# Patient Record
Sex: MALE | Race: WHITE | Employment: OTHER | URBAN - METROPOLITAN AREA
[De-identification: names, ages, dates, MRNs, and addresses within clinical notes are randomized per-mention and may not be internally consistent; named-entity substitution may affect disease eponyms.]

---

## 2017-01-01 ENCOUNTER — APPOINTMENT (OUTPATIENT)
Dept: INFUSION THERAPY | Age: 77
End: 2017-01-01
Payer: MEDICARE

## 2017-01-01 ENCOUNTER — HOSPITAL ENCOUNTER (OUTPATIENT)
Dept: INFUSION THERAPY | Age: 77
Discharge: HOME | End: 2017-10-20
Payer: MEDICARE

## 2017-01-01 ENCOUNTER — HOSPITAL ENCOUNTER (OUTPATIENT)
Dept: INFUSION THERAPY | Age: 77
Discharge: HOME | End: 2017-08-23
Payer: MEDICARE

## 2017-01-01 ENCOUNTER — HOSPITAL ENCOUNTER (OUTPATIENT)
Dept: LAB | Age: 77
Discharge: HOME | End: 2017-10-25
Payer: MEDICARE

## 2017-01-01 ENCOUNTER — HOSPITAL ENCOUNTER (OUTPATIENT)
Dept: LAB | Age: 77
Discharge: HOME | End: 2017-10-18
Payer: MEDICARE

## 2017-01-01 ENCOUNTER — HOSPITAL ENCOUNTER (OUTPATIENT)
Dept: INFUSION THERAPY | Age: 77
Discharge: HOME | End: 2017-08-16
Payer: MEDICARE

## 2017-01-01 ENCOUNTER — PATIENT OUTREACH (OUTPATIENT)
Dept: CASE MANAGEMENT | Age: 77
End: 2017-01-01

## 2017-01-01 ENCOUNTER — HOSPITAL ENCOUNTER (OUTPATIENT)
Dept: INFUSION THERAPY | Age: 77
Discharge: HOME | End: 2017-11-01
Payer: MEDICARE

## 2017-01-01 ENCOUNTER — HOSPITAL ENCOUNTER (OUTPATIENT)
Dept: LAB | Age: 77
Discharge: HOME | End: 2017-12-20
Payer: MEDICARE

## 2017-01-01 ENCOUNTER — HOSPITAL ENCOUNTER (OUTPATIENT)
Dept: INFUSION THERAPY | Age: 77
Discharge: HOME | End: 2017-12-06
Payer: MEDICARE

## 2017-01-01 ENCOUNTER — HOSPITAL ENCOUNTER (OUTPATIENT)
Dept: INFUSION THERAPY | Age: 77
Discharge: HOME | End: 2017-10-04
Payer: MEDICARE

## 2017-01-01 ENCOUNTER — HOSPITAL ENCOUNTER (OUTPATIENT)
Dept: LAB | Age: 77
Discharge: HOME | End: 2017-10-04
Payer: MEDICARE

## 2017-01-01 ENCOUNTER — HOSPITAL ENCOUNTER (OUTPATIENT)
Dept: PET IMAGING | Age: 77
Discharge: HOME | End: 2017-09-19
Payer: MEDICARE

## 2017-01-01 ENCOUNTER — HOSPITAL ENCOUNTER (OUTPATIENT)
Dept: INFUSION THERAPY | Age: 77
Discharge: HOME | End: 2017-10-18
Payer: MEDICARE

## 2017-01-01 ENCOUNTER — HOSPITAL ENCOUNTER (OUTPATIENT)
Dept: PET IMAGING | Age: 77
Discharge: HOME | End: 2017-12-12
Payer: MEDICARE

## 2017-01-01 ENCOUNTER — HOSPITAL ENCOUNTER (OUTPATIENT)
Dept: INFUSION THERAPY | Age: 77
Discharge: HOME | End: 2017-09-27
Payer: MEDICARE

## 2017-01-01 ENCOUNTER — HOSPITAL ENCOUNTER (OUTPATIENT)
Dept: INFUSION THERAPY | Age: 77
Discharge: HOME | End: 2017-09-13
Payer: MEDICARE

## 2017-01-01 ENCOUNTER — HOSPITAL ENCOUNTER (OUTPATIENT)
Dept: LAB | Age: 77
Discharge: HOME | End: 2017-09-27
Payer: MEDICARE

## 2017-01-01 ENCOUNTER — HOSPITAL ENCOUNTER (OUTPATIENT)
Dept: INFUSION THERAPY | Age: 77
Discharge: HOME | End: 2017-11-15
Payer: MEDICARE

## 2017-01-01 ENCOUNTER — HOSPITAL ENCOUNTER (OUTPATIENT)
Dept: INFUSION THERAPY | Age: 77
Discharge: HOME | End: 2017-10-06
Payer: MEDICARE

## 2017-01-01 ENCOUNTER — HOSPITAL ENCOUNTER (OUTPATIENT)
Dept: INFUSION THERAPY | Age: 77
End: 2017-01-01
Payer: MEDICARE

## 2017-01-01 ENCOUNTER — HOSPITAL ENCOUNTER (OUTPATIENT)
Dept: INFUSION THERAPY | Age: 77
Discharge: HOME | End: 2017-09-21
Payer: MEDICARE

## 2017-01-01 ENCOUNTER — HOSPITAL ENCOUNTER (OUTPATIENT)
Dept: INFUSION THERAPY | Age: 77
Discharge: HOME | End: 2017-09-06
Payer: MEDICARE

## 2017-01-01 ENCOUNTER — HOSPITAL ENCOUNTER (OUTPATIENT)
Dept: INFUSION THERAPY | Age: 77
Discharge: HOME | End: 2017-12-20
Payer: MEDICARE

## 2017-01-01 ENCOUNTER — HOSPITAL ENCOUNTER (OUTPATIENT)
Dept: INFUSION THERAPY | Age: 77
Discharge: HOME | End: 2017-11-06
Payer: MEDICARE

## 2017-01-01 ENCOUNTER — HOSPITAL ENCOUNTER (OUTPATIENT)
Dept: INFUSION THERAPY | Age: 77
Discharge: HOME | End: 2017-11-08
Payer: MEDICARE

## 2017-01-01 ENCOUNTER — HOSPITAL ENCOUNTER (OUTPATIENT)
Dept: INFUSION THERAPY | Age: 77
Discharge: HOME | End: 2017-11-29
Payer: MEDICARE

## 2017-01-01 ENCOUNTER — HOSPITAL ENCOUNTER (OUTPATIENT)
Dept: INFUSION THERAPY | Age: 77
Discharge: HOME | End: 2017-11-22
Payer: MEDICARE

## 2017-01-01 ENCOUNTER — HOSPITAL ENCOUNTER (OUTPATIENT)
Dept: INFUSION THERAPY | Age: 77
Discharge: HOME | End: 2017-12-12
Payer: MEDICARE

## 2017-01-01 ENCOUNTER — HOSPITAL ENCOUNTER (OUTPATIENT)
Dept: INFUSION THERAPY | Age: 77
Discharge: HOME | End: 2017-08-30
Payer: MEDICARE

## 2017-01-01 ENCOUNTER — HOSPITAL ENCOUNTER (OUTPATIENT)
Dept: INFUSION THERAPY | Age: 77
Discharge: HOME | End: 2017-10-13
Payer: MEDICARE

## 2017-01-01 ENCOUNTER — HOSPITAL ENCOUNTER (OUTPATIENT)
Dept: INFUSION THERAPY | Age: 77
Discharge: HOME | End: 2017-10-25
Payer: MEDICARE

## 2017-01-01 ENCOUNTER — HOSPITAL ENCOUNTER (OUTPATIENT)
Dept: LAB | Age: 77
Discharge: HOME | End: 2017-09-21
Payer: MEDICARE

## 2017-01-01 VITALS
WEIGHT: 170.6 LBS | TEMPERATURE: 97.5 F | SYSTOLIC BLOOD PRESSURE: 101 MMHG | RESPIRATION RATE: 16 BRPM | OXYGEN SATURATION: 94 % | HEART RATE: 84 BPM | DIASTOLIC BLOOD PRESSURE: 52 MMHG | BODY MASS INDEX: 24.48 KG/M2

## 2017-01-01 VITALS
HEART RATE: 84 BPM | BODY MASS INDEX: 24.54 KG/M2 | WEIGHT: 171 LBS | SYSTOLIC BLOOD PRESSURE: 99 MMHG | OXYGEN SATURATION: 92 % | TEMPERATURE: 97.6 F | RESPIRATION RATE: 18 BRPM | DIASTOLIC BLOOD PRESSURE: 54 MMHG

## 2017-01-01 VITALS
RESPIRATION RATE: 18 BRPM | TEMPERATURE: 97.9 F | SYSTOLIC BLOOD PRESSURE: 110 MMHG | WEIGHT: 178.8 LBS | BODY MASS INDEX: 25.66 KG/M2 | HEART RATE: 80 BPM | DIASTOLIC BLOOD PRESSURE: 66 MMHG | OXYGEN SATURATION: 93 %

## 2017-01-01 VITALS
RESPIRATION RATE: 16 BRPM | SYSTOLIC BLOOD PRESSURE: 100 MMHG | OXYGEN SATURATION: 95 % | HEART RATE: 83 BPM | TEMPERATURE: 98 F | DIASTOLIC BLOOD PRESSURE: 57 MMHG | WEIGHT: 188.8 LBS | BODY MASS INDEX: 27.09 KG/M2

## 2017-01-01 VITALS
SYSTOLIC BLOOD PRESSURE: 106 MMHG | TEMPERATURE: 98 F | OXYGEN SATURATION: 92 % | RESPIRATION RATE: 18 BRPM | WEIGHT: 176.5 LBS | HEART RATE: 69 BPM | BODY MASS INDEX: 25.33 KG/M2 | DIASTOLIC BLOOD PRESSURE: 65 MMHG

## 2017-01-01 VITALS
DIASTOLIC BLOOD PRESSURE: 50 MMHG | OXYGEN SATURATION: 94 % | TEMPERATURE: 97.7 F | HEART RATE: 86 BPM | SYSTOLIC BLOOD PRESSURE: 115 MMHG | RESPIRATION RATE: 18 BRPM | BODY MASS INDEX: 24.82 KG/M2 | WEIGHT: 173 LBS

## 2017-01-01 VITALS
OXYGEN SATURATION: 96 % | RESPIRATION RATE: 18 BRPM | DIASTOLIC BLOOD PRESSURE: 58 MMHG | TEMPERATURE: 97.8 F | SYSTOLIC BLOOD PRESSURE: 101 MMHG | HEART RATE: 90 BPM | BODY MASS INDEX: 25.42 KG/M2 | WEIGHT: 187.4 LBS

## 2017-01-01 VITALS
TEMPERATURE: 98.3 F | OXYGEN SATURATION: 95 % | SYSTOLIC BLOOD PRESSURE: 105 MMHG | BODY MASS INDEX: 26.29 KG/M2 | WEIGHT: 183.2 LBS | DIASTOLIC BLOOD PRESSURE: 61 MMHG | HEART RATE: 86 BPM | RESPIRATION RATE: 18 BRPM

## 2017-01-01 VITALS
WEIGHT: 177.6 LBS | SYSTOLIC BLOOD PRESSURE: 107 MMHG | BODY MASS INDEX: 25.48 KG/M2 | RESPIRATION RATE: 16 BRPM | OXYGEN SATURATION: 94 % | DIASTOLIC BLOOD PRESSURE: 51 MMHG | HEART RATE: 81 BPM | TEMPERATURE: 99.7 F

## 2017-01-01 VITALS
SYSTOLIC BLOOD PRESSURE: 110 MMHG | RESPIRATION RATE: 16 BRPM | DIASTOLIC BLOOD PRESSURE: 48 MMHG | WEIGHT: 177.4 LBS | OXYGEN SATURATION: 97 % | HEART RATE: 74 BPM | TEMPERATURE: 97.8 F | BODY MASS INDEX: 25.45 KG/M2

## 2017-01-01 VITALS
BODY MASS INDEX: 24.28 KG/M2 | RESPIRATION RATE: 18 BRPM | SYSTOLIC BLOOD PRESSURE: 117 MMHG | OXYGEN SATURATION: 96 % | TEMPERATURE: 97.7 F | WEIGHT: 169.2 LBS | DIASTOLIC BLOOD PRESSURE: 58 MMHG | HEART RATE: 79 BPM

## 2017-01-01 VITALS
BODY MASS INDEX: 24.48 KG/M2 | TEMPERATURE: 98.2 F | OXYGEN SATURATION: 96 % | DIASTOLIC BLOOD PRESSURE: 59 MMHG | WEIGHT: 170.6 LBS | RESPIRATION RATE: 18 BRPM | SYSTOLIC BLOOD PRESSURE: 125 MMHG | HEART RATE: 94 BPM

## 2017-01-01 VITALS
WEIGHT: 169.6 LBS | TEMPERATURE: 97.3 F | HEART RATE: 79 BPM | BODY MASS INDEX: 24.34 KG/M2 | SYSTOLIC BLOOD PRESSURE: 103 MMHG | OXYGEN SATURATION: 95 % | DIASTOLIC BLOOD PRESSURE: 51 MMHG | RESPIRATION RATE: 18 BRPM

## 2017-01-01 VITALS
SYSTOLIC BLOOD PRESSURE: 90 MMHG | HEART RATE: 83 BPM | BODY MASS INDEX: 24.39 KG/M2 | DIASTOLIC BLOOD PRESSURE: 53 MMHG | OXYGEN SATURATION: 94 % | RESPIRATION RATE: 18 BRPM | TEMPERATURE: 98.1 F | WEIGHT: 170 LBS

## 2017-01-01 VITALS
TEMPERATURE: 97.4 F | HEART RATE: 77 BPM | SYSTOLIC BLOOD PRESSURE: 120 MMHG | OXYGEN SATURATION: 97 % | DIASTOLIC BLOOD PRESSURE: 45 MMHG | RESPIRATION RATE: 18 BRPM

## 2017-01-01 VITALS
RESPIRATION RATE: 18 BRPM | TEMPERATURE: 97.6 F | OXYGEN SATURATION: 94 % | HEART RATE: 77 BPM | SYSTOLIC BLOOD PRESSURE: 107 MMHG | WEIGHT: 171.2 LBS | BODY MASS INDEX: 24.56 KG/M2 | DIASTOLIC BLOOD PRESSURE: 65 MMHG

## 2017-01-01 VITALS
HEART RATE: 85 BPM | TEMPERATURE: 98.4 F | SYSTOLIC BLOOD PRESSURE: 106 MMHG | OXYGEN SATURATION: 96 % | RESPIRATION RATE: 18 BRPM | BODY MASS INDEX: 25.04 KG/M2 | DIASTOLIC BLOOD PRESSURE: 57 MMHG | WEIGHT: 184.6 LBS

## 2017-01-01 VITALS
TEMPERATURE: 98.4 F | DIASTOLIC BLOOD PRESSURE: 57 MMHG | OXYGEN SATURATION: 95 % | RESPIRATION RATE: 18 BRPM | HEART RATE: 84 BPM | SYSTOLIC BLOOD PRESSURE: 116 MMHG

## 2017-01-01 DIAGNOSIS — C34.10 MALIGNANT NEOPLASM OF UPPER LOBE OF LUNG, UNSPECIFIED LATERALITY (HCC): ICD-10-CM

## 2017-01-01 DIAGNOSIS — C34.12 MALIGNANT NEOPLASM OF UPPER LOBE OF LEFT LUNG (HCC): ICD-10-CM

## 2017-01-01 LAB
ALBUMIN SERPL-MCNC: 2.9 G/DL (ref 3.2–4.6)
ALBUMIN SERPL-MCNC: 3 G/DL (ref 3.2–4.6)
ALBUMIN SERPL-MCNC: 3.2 G/DL (ref 3.2–4.6)
ALBUMIN SERPL-MCNC: 3.2 G/DL (ref 3.2–4.6)
ALBUMIN SERPL-MCNC: 3.4 G/DL (ref 3.2–4.6)
ALBUMIN SERPL-MCNC: 3.4 G/DL (ref 3.2–4.6)
ALBUMIN SERPL-MCNC: 3.5 G/DL (ref 3.2–4.6)
ALBUMIN/GLOB SERPL: 0.8 {RATIO} (ref 1.2–3.5)
ALBUMIN/GLOB SERPL: 0.9 {RATIO}
ALBUMIN/GLOB SERPL: 0.9 {RATIO} (ref 1.2–3.5)
ALBUMIN/GLOB SERPL: 0.9 {RATIO} (ref 1.2–3.5)
ALBUMIN/GLOB SERPL: 1 {RATIO} (ref 1.2–3.5)
ALP SERPL-CCNC: 112 U/L (ref 50–136)
ALP SERPL-CCNC: 119 U/L (ref 50–136)
ALP SERPL-CCNC: 82 U/L (ref 50–136)
ALP SERPL-CCNC: 85 U/L (ref 50–136)
ALP SERPL-CCNC: 94 U/L (ref 50–136)
ALP SERPL-CCNC: 95 U/L (ref 50–136)
ALP SERPL-CCNC: 96 U/L (ref 50–136)
ALT SERPL-CCNC: 17 U/L (ref 12–65)
ALT SERPL-CCNC: 18 U/L (ref 12–65)
ALT SERPL-CCNC: 21 U/L (ref 12–65)
ALT SERPL-CCNC: 22 U/L (ref 12–65)
ALT SERPL-CCNC: 23 U/L (ref 12–65)
ALT SERPL-CCNC: 32 U/L (ref 12–65)
ALT SERPL-CCNC: 36 U/L (ref 12–65)
ANION GAP SERPL CALC-SCNC: 10 MMOL/L
ANION GAP SERPL CALC-SCNC: 10 MMOL/L (ref 7–16)
ANION GAP SERPL CALC-SCNC: 12 MMOL/L (ref 7–16)
ANION GAP SERPL CALC-SCNC: 6 MMOL/L (ref 7–16)
ANION GAP SERPL CALC-SCNC: 7 MMOL/L (ref 7–16)
ANION GAP SERPL CALC-SCNC: 7 MMOL/L (ref 7–16)
ANION GAP SERPL CALC-SCNC: 8 MMOL/L (ref 7–16)
AST SERPL-CCNC: 13 U/L (ref 15–37)
AST SERPL-CCNC: 14 U/L (ref 15–37)
AST SERPL-CCNC: 15 U/L (ref 15–37)
AST SERPL-CCNC: 15 U/L (ref 15–37)
AST SERPL-CCNC: 18 U/L (ref 15–37)
AST SERPL-CCNC: 19 U/L (ref 15–37)
AST SERPL-CCNC: 25 U/L (ref 15–37)
BASOPHILS # BLD: 0 K/UL (ref 0–0.2)
BASOPHILS NFR BLD: 0 % (ref 0–2)
BASOPHILS NFR BLD: 0 % (ref 0–2)
BASOPHILS NFR BLD: 1 % (ref 0–2)
BILIRUB SERPL-MCNC: 0.5 MG/DL (ref 0.2–1.1)
BILIRUB SERPL-MCNC: 0.5 MG/DL (ref 0.2–1.1)
BILIRUB SERPL-MCNC: 0.6 MG/DL (ref 0.2–1.1)
BILIRUB SERPL-MCNC: 0.7 MG/DL (ref 0.2–1.1)
BILIRUB SERPL-MCNC: 0.8 MG/DL (ref 0.2–1.1)
BILIRUB SERPL-MCNC: 0.9 MG/DL (ref 0.2–1.1)
BILIRUB SERPL-MCNC: 1 MG/DL (ref 0.2–1.1)
BUN SERPL-MCNC: 11 MG/DL (ref 8–23)
BUN SERPL-MCNC: 12 MG/DL (ref 8–23)
BUN SERPL-MCNC: 13 MG/DL (ref 8–23)
BUN SERPL-MCNC: 20 MG/DL (ref 8–23)
BUN SERPL-MCNC: 20 MG/DL (ref 8–23)
BUN SERPL-MCNC: 21 MG/DL (ref 8–23)
BUN SERPL-MCNC: 23 MG/DL (ref 8–23)
CALCIUM SERPL-MCNC: 8.7 MG/DL (ref 8.3–10.4)
CALCIUM SERPL-MCNC: 9 MG/DL (ref 8.3–10.4)
CALCIUM SERPL-MCNC: 9.1 MG/DL (ref 8.3–10.4)
CALCIUM SERPL-MCNC: 9.2 MG/DL (ref 8.3–10.4)
CALCIUM SERPL-MCNC: 9.6 MG/DL (ref 8.3–10.4)
CHLORIDE SERPL-SCNC: 101 MMOL/L (ref 98–107)
CHLORIDE SERPL-SCNC: 104 MMOL/L (ref 98–107)
CHLORIDE SERPL-SCNC: 106 MMOL/L (ref 98–107)
CHLORIDE SERPL-SCNC: 107 MMOL/L (ref 98–107)
CHLORIDE SERPL-SCNC: 107 MMOL/L (ref 98–107)
CHLORIDE SERPL-SCNC: 108 MMOL/L (ref 98–107)
CHLORIDE SERPL-SCNC: 109 MMOL/L (ref 98–107)
CO2 SERPL-SCNC: 22 MMOL/L (ref 21–32)
CO2 SERPL-SCNC: 24 MMOL/L (ref 21–32)
CO2 SERPL-SCNC: 24 MMOL/L (ref 21–32)
CO2 SERPL-SCNC: 25 MMOL/L (ref 21–32)
CO2 SERPL-SCNC: 25 MMOL/L (ref 21–32)
CO2 SERPL-SCNC: 27 MMOL/L (ref 21–32)
CO2 SERPL-SCNC: 27 MMOL/L (ref 21–32)
CREAT SERPL-MCNC: 1.24 MG/DL (ref 0.8–1.5)
CREAT SERPL-MCNC: 1.29 MG/DL (ref 0.8–1.5)
CREAT SERPL-MCNC: 1.39 MG/DL (ref 0.8–1.5)
CREAT SERPL-MCNC: 1.39 MG/DL (ref 0.8–1.5)
CREAT SERPL-MCNC: 1.5 MG/DL (ref 0.8–1.5)
CREAT SERPL-MCNC: 1.55 MG/DL (ref 0.8–1.5)
CREAT SERPL-MCNC: 1.6 MG/DL (ref 0.8–1.5)
DIFFERENTIAL METHOD BLD: ABNORMAL
EOSINOPHIL # BLD: 0 K/UL (ref 0–0.8)
EOSINOPHIL # BLD: 0.1 K/UL (ref 0–0.8)
EOSINOPHIL # BLD: 0.1 K/UL (ref 0–0.8)
EOSINOPHIL NFR BLD: 0 % (ref 0.5–7.8)
EOSINOPHIL NFR BLD: 1 % (ref 0.5–7.8)
EOSINOPHIL NFR BLD: 1 % (ref 0.5–7.8)
EOSINOPHIL NFR BLD: 2 % (ref 0.5–7.8)
EOSINOPHIL NFR BLD: 5 % (ref 0.5–7.8)
ERYTHROCYTE [DISTWIDTH] IN BLOOD BY AUTOMATED COUNT: 12.7 % (ref 11.9–14.6)
ERYTHROCYTE [DISTWIDTH] IN BLOOD BY AUTOMATED COUNT: 12.7 % (ref 11.9–14.6)
ERYTHROCYTE [DISTWIDTH] IN BLOOD BY AUTOMATED COUNT: 13.1 % (ref 11.9–14.6)
ERYTHROCYTE [DISTWIDTH] IN BLOOD BY AUTOMATED COUNT: 14.1 % (ref 11.9–14.6)
ERYTHROCYTE [DISTWIDTH] IN BLOOD BY AUTOMATED COUNT: 14.2 % (ref 11.9–14.6)
ERYTHROCYTE [DISTWIDTH] IN BLOOD BY AUTOMATED COUNT: 14.3 % (ref 11.9–14.6)
ERYTHROCYTE [DISTWIDTH] IN BLOOD BY AUTOMATED COUNT: 17.2 % (ref 11.9–14.6)
GLOBULIN SER CALC-MCNC: 3.4 G/DL (ref 2.3–3.5)
GLOBULIN SER CALC-MCNC: 3.5 G/DL (ref 2.3–3.5)
GLOBULIN SER CALC-MCNC: 3.5 G/DL (ref 2.3–3.5)
GLOBULIN SER CALC-MCNC: 3.7 G/DL (ref 2.3–3.5)
GLOBULIN SER CALC-MCNC: 3.8 G/DL
GLOBULIN SER CALC-MCNC: 3.8 G/DL (ref 2.3–3.5)
GLOBULIN SER CALC-MCNC: 3.9 G/DL (ref 2.3–3.5)
GLUCOSE SERPL-MCNC: 116 MG/DL (ref 65–100)
GLUCOSE SERPL-MCNC: 116 MG/DL (ref 65–100)
GLUCOSE SERPL-MCNC: 117 MG/DL (ref 65–100)
GLUCOSE SERPL-MCNC: 120 MG/DL (ref 65–100)
GLUCOSE SERPL-MCNC: 122 MG/DL (ref 65–100)
GLUCOSE SERPL-MCNC: 136 MG/DL (ref 65–100)
GLUCOSE SERPL-MCNC: 141 MG/DL (ref 65–100)
HCT VFR BLD AUTO: 31.8 % (ref 41.1–50.3)
HCT VFR BLD AUTO: 32.5 % (ref 41.1–50.3)
HCT VFR BLD AUTO: 33.1 % (ref 41.1–50.3)
HCT VFR BLD AUTO: 33.8 % (ref 41.1–50.3)
HCT VFR BLD AUTO: 34 % (ref 41.1–50.3)
HCT VFR BLD AUTO: 34.6 % (ref 41.1–50.3)
HCT VFR BLD AUTO: 38 % (ref 41.1–50.3)
HGB BLD-MCNC: 11.1 G/DL (ref 13.6–17.2)
HGB BLD-MCNC: 11.1 G/DL (ref 13.6–17.2)
HGB BLD-MCNC: 11.5 G/DL (ref 13.6–17.2)
HGB BLD-MCNC: 11.6 G/DL (ref 13.6–17.2)
HGB BLD-MCNC: 11.6 G/DL (ref 13.6–17.2)
HGB BLD-MCNC: 11.9 G/DL (ref 13.6–17.2)
HGB BLD-MCNC: 12.8 G/DL (ref 13.6–17.2)
LYMPHOCYTES # BLD: 0.5 K/UL (ref 0.5–4.6)
LYMPHOCYTES # BLD: 0.5 K/UL (ref 0.5–4.6)
LYMPHOCYTES # BLD: 0.7 K/UL (ref 0.5–4.6)
LYMPHOCYTES # BLD: 0.8 K/UL (ref 0.5–4.6)
LYMPHOCYTES # BLD: 0.9 K/UL (ref 0.5–4.6)
LYMPHOCYTES NFR BLD: 12 % (ref 13–44)
LYMPHOCYTES NFR BLD: 13 % (ref 13–44)
LYMPHOCYTES NFR BLD: 13 % (ref 13–44)
LYMPHOCYTES NFR BLD: 15 % (ref 13–44)
LYMPHOCYTES NFR BLD: 15 % (ref 13–44)
LYMPHOCYTES NFR BLD: 25 % (ref 13–44)
LYMPHOCYTES NFR BLD: 35 % (ref 13–44)
MAGNESIUM SERPL-MCNC: 1.3 MG/DL (ref 1.8–2.4)
MAGNESIUM SERPL-MCNC: 1.6 MG/DL (ref 1.8–2.4)
MAGNESIUM SERPL-MCNC: 1.7 MG/DL (ref 1.8–2.4)
MAGNESIUM SERPL-MCNC: 2 MG/DL (ref 1.8–2.4)
MCH RBC QN AUTO: 34 PG (ref 26.1–32.9)
MCH RBC QN AUTO: 34.6 PG (ref 26.1–32.9)
MCH RBC QN AUTO: 34.6 PG (ref 26.1–32.9)
MCH RBC QN AUTO: 34.7 PG (ref 26.1–32.9)
MCH RBC QN AUTO: 35 PG (ref 26.1–32.9)
MCH RBC QN AUTO: 35.2 PG (ref 26.1–32.9)
MCH RBC QN AUTO: 35.3 PG (ref 26.1–32.9)
MCHC RBC AUTO-ENTMCNC: 33.7 G/DL (ref 31.4–35)
MCHC RBC AUTO-ENTMCNC: 34 G/DL (ref 31.4–35)
MCHC RBC AUTO-ENTMCNC: 34.1 G/DL (ref 31.4–35)
MCHC RBC AUTO-ENTMCNC: 34.2 G/DL (ref 31.4–35)
MCHC RBC AUTO-ENTMCNC: 34.4 G/DL (ref 31.4–35)
MCHC RBC AUTO-ENTMCNC: 34.9 G/DL (ref 31.4–35)
MCHC RBC AUTO-ENTMCNC: 35 G/DL (ref 31.4–35)
MCV RBC AUTO: 100.3 FL (ref 79.6–97.8)
MCV RBC AUTO: 101.1 FL (ref 79.6–97.8)
MCV RBC AUTO: 101.2 FL (ref 79.6–97.8)
MCV RBC AUTO: 102.1 FL (ref 79.6–97.8)
MCV RBC AUTO: 102.7 FL (ref 79.6–97.8)
MCV RBC AUTO: 103 FL (ref 79.6–97.8)
MCV RBC AUTO: 98.8 FL (ref 79.6–97.8)
MONOCYTES # BLD: 0 K/UL (ref 0.1–1.3)
MONOCYTES # BLD: 0 K/UL (ref 0.1–1.3)
MONOCYTES # BLD: 0.5 K/UL (ref 0.1–1.3)
MONOCYTES # BLD: 0.7 K/UL (ref 0.1–1.3)
MONOCYTES # BLD: 1.3 K/UL (ref 0.1–1.3)
MONOCYTES NFR BLD: 1 % (ref 4–12)
MONOCYTES NFR BLD: 10 % (ref 4–12)
MONOCYTES NFR BLD: 12 % (ref 4–12)
MONOCYTES NFR BLD: 12 % (ref 4–12)
MONOCYTES NFR BLD: 18 % (ref 4–12)
MONOCYTES NFR BLD: 2 % (ref 4–12)
MONOCYTES NFR BLD: 9 % (ref 4–12)
NEUTS SEG # BLD: 1 K/UL (ref 1.7–8.2)
NEUTS SEG # BLD: 1.5 K/UL (ref 1.7–8.2)
NEUTS SEG # BLD: 4.3 K/UL (ref 1.7–8.2)
NEUTS SEG # BLD: 4.4 K/UL (ref 1.7–8.2)
NEUTS SEG # BLD: 4.4 K/UL (ref 1.7–8.2)
NEUTS SEG # BLD: 5 K/UL (ref 1.7–8.2)
NEUTS SEG # BLD: 5.5 K/UL (ref 1.7–8.2)
NEUTS SEG NFR BLD: 60 % (ref 43–78)
NEUTS SEG NFR BLD: 68 % (ref 43–78)
NEUTS SEG NFR BLD: 70 % (ref 43–78)
NEUTS SEG NFR BLD: 72 % (ref 43–78)
NEUTS SEG NFR BLD: 75 % (ref 43–78)
NEUTS SEG NFR BLD: 75 % (ref 43–78)
NEUTS SEG NFR BLD: 77 % (ref 43–78)
NRBC # BLD: 0 K/UL (ref 0–0.2)
NRBC BLD-RTO: 0 PER 100 WBC (ref 0–2)
PLATELET # BLD AUTO: 123 K/UL (ref 150–450)
PLATELET # BLD AUTO: 156 K/UL (ref 150–450)
PLATELET # BLD AUTO: 163 K/UL (ref 150–450)
PLATELET # BLD AUTO: 181 K/UL (ref 150–450)
PLATELET # BLD AUTO: 187 K/UL (ref 150–450)
PLATELET # BLD AUTO: 196 K/UL (ref 150–450)
PLATELET # BLD AUTO: 95 K/UL (ref 150–450)
PLATELET COMMENTS,PCOM: SLIGHT
PLATELET COMMENTS,PCOM: SLIGHT
PMV BLD AUTO: 8.6 FL (ref 10.8–14.1)
PMV BLD AUTO: 8.7 FL (ref 10.8–14.1)
PMV BLD AUTO: 8.8 FL (ref 10.8–14.1)
PMV BLD AUTO: 8.8 FL (ref 10.8–14.1)
PMV BLD AUTO: 8.9 FL (ref 10.8–14.1)
PMV BLD AUTO: 9.6 FL (ref 10.8–14.1)
PMV BLD AUTO: 9.9 FL (ref 10.8–14.1)
POTASSIUM SERPL-SCNC: 3.3 MMOL/L (ref 3.5–5.1)
POTASSIUM SERPL-SCNC: 3.7 MMOL/L (ref 3.5–5.1)
POTASSIUM SERPL-SCNC: 3.8 MMOL/L (ref 3.5–5.1)
POTASSIUM SERPL-SCNC: 3.8 MMOL/L (ref 3.5–5.1)
POTASSIUM SERPL-SCNC: 3.9 MMOL/L (ref 3.5–5.1)
POTASSIUM SERPL-SCNC: 4 MMOL/L (ref 3.5–5.1)
POTASSIUM SERPL-SCNC: 4 MMOL/L (ref 3.5–5.1)
PROT SERPL-MCNC: 6.4 G/DL (ref 6.3–8.2)
PROT SERPL-MCNC: 6.6 G/DL (ref 6.3–8.2)
PROT SERPL-MCNC: 6.7 G/DL (ref 6.3–8.2)
PROT SERPL-MCNC: 7 G/DL (ref 6.3–8.2)
PROT SERPL-MCNC: 7.1 G/DL (ref 6.3–8.2)
PROT SERPL-MCNC: 7.2 G/DL (ref 6.3–8.2)
PROT SERPL-MCNC: 7.2 G/DL (ref 6.3–8.2)
RBC # BLD AUTO: 3.17 M/UL (ref 4.23–5.67)
RBC # BLD AUTO: 3.21 M/UL (ref 4.23–5.67)
RBC # BLD AUTO: 3.3 M/UL (ref 4.23–5.67)
RBC # BLD AUTO: 3.31 M/UL (ref 4.23–5.67)
RBC # BLD AUTO: 3.35 M/UL (ref 4.23–5.67)
RBC # BLD AUTO: 3.37 M/UL (ref 4.23–5.67)
RBC # BLD AUTO: 3.76 M/UL (ref 4.23–5.67)
RBC MORPH BLD: ABNORMAL
RBC MORPH BLD: ABNORMAL
SODIUM SERPL-SCNC: 135 MMOL/L (ref 136–145)
SODIUM SERPL-SCNC: 138 MMOL/L (ref 136–145)
SODIUM SERPL-SCNC: 139 MMOL/L (ref 136–145)
SODIUM SERPL-SCNC: 141 MMOL/L (ref 136–145)
WBC # BLD AUTO: 1.5 K/UL (ref 4.3–11.1)
WBC # BLD AUTO: 2.1 K/UL (ref 4.3–11.1)
WBC # BLD AUTO: 5.8 K/UL (ref 4.3–11.1)
WBC # BLD AUTO: 5.8 K/UL (ref 4.3–11.1)
WBC # BLD AUTO: 5.9 K/UL (ref 4.3–11.1)
WBC # BLD AUTO: 7.1 K/UL (ref 4.3–11.1)
WBC # BLD AUTO: 7.2 K/UL (ref 4.3–11.1)
WBC MORPH BLD: ABNORMAL
WBC MORPH BLD: ABNORMAL

## 2017-01-01 PROCEDURE — 74011250636 HC RX REV CODE- 250/636: Performed by: INTERNAL MEDICINE

## 2017-01-01 PROCEDURE — 80053 COMPREHEN METABOLIC PANEL: CPT | Performed by: INTERNAL MEDICINE

## 2017-01-01 PROCEDURE — 74011000250 HC RX REV CODE- 250: Performed by: INTERNAL MEDICINE

## 2017-01-01 PROCEDURE — 83735 ASSAY OF MAGNESIUM: CPT | Performed by: INTERNAL MEDICINE

## 2017-01-01 PROCEDURE — A9552 F18 FDG: HCPCS

## 2017-01-01 PROCEDURE — 96360 HYDRATION IV INFUSION INIT: CPT

## 2017-01-01 PROCEDURE — 96523 IRRIG DRUG DELIVERY DEVICE: CPT

## 2017-01-01 PROCEDURE — 96375 TX/PRO/DX INJ NEW DRUG ADDON: CPT

## 2017-01-01 PROCEDURE — 96413 CHEMO IV INFUSION 1 HR: CPT

## 2017-01-01 PROCEDURE — 74011000258 HC RX REV CODE- 258: Performed by: INTERNAL MEDICINE

## 2017-01-01 PROCEDURE — 85025 COMPLETE CBC W/AUTO DIFF WBC: CPT | Performed by: INTERNAL MEDICINE

## 2017-01-01 PROCEDURE — 90471 IMMUNIZATION ADMIN: CPT

## 2017-01-01 PROCEDURE — 74011636320 HC RX REV CODE- 636/320: Performed by: INTERNAL MEDICINE

## 2017-01-01 PROCEDURE — 85025 COMPLETE CBC W/AUTO DIFF WBC: CPT | Performed by: NURSE PRACTITIONER

## 2017-01-01 PROCEDURE — 96415 CHEMO IV INFUSION ADDL HR: CPT

## 2017-01-01 PROCEDURE — 74011250636 HC RX REV CODE- 250/636: Performed by: NURSE PRACTITIONER

## 2017-01-01 PROCEDURE — 96361 HYDRATE IV INFUSION ADD-ON: CPT

## 2017-01-01 PROCEDURE — 96417 CHEMO IV INFUS EACH ADDL SEQ: CPT

## 2017-01-01 PROCEDURE — 96367 TX/PROPH/DG ADDL SEQ IV INF: CPT

## 2017-01-01 PROCEDURE — 90686 IIV4 VACC NO PRSV 0.5 ML IM: CPT | Performed by: NURSE PRACTITIONER

## 2017-01-01 PROCEDURE — 80053 COMPREHEN METABOLIC PANEL: CPT | Performed by: NURSE PRACTITIONER

## 2017-01-01 PROCEDURE — 96368 THER/DIAG CONCURRENT INF: CPT

## 2017-01-01 RX ORDER — HEPARIN 100 UNIT/ML
600 SYRINGE INTRAVENOUS AS NEEDED
Status: DISPENSED | OUTPATIENT
Start: 2017-01-01 | End: 2017-01-01

## 2017-01-01 RX ORDER — SODIUM CHLORIDE 0.9 % (FLUSH) 0.9 %
10-20 SYRINGE (ML) INJECTION AS NEEDED
Status: DISCONTINUED | OUTPATIENT
Start: 2017-01-01 | End: 2017-01-01 | Stop reason: HOSPADM

## 2017-01-01 RX ORDER — SODIUM CHLORIDE 9 MG/ML
1000 INJECTION, SOLUTION INTRAVENOUS ONCE
Status: COMPLETED | OUTPATIENT
Start: 2017-01-01 | End: 2017-01-01

## 2017-01-01 RX ORDER — ONDANSETRON 2 MG/ML
8 INJECTION INTRAMUSCULAR; INTRAVENOUS ONCE
Status: COMPLETED | OUTPATIENT
Start: 2017-01-01 | End: 2017-01-01

## 2017-01-01 RX ORDER — SODIUM CHLORIDE 0.9 % (FLUSH) 0.9 %
10-40 SYRINGE (ML) INJECTION AS NEEDED
Status: DISCONTINUED | OUTPATIENT
Start: 2017-01-01 | End: 2017-01-01 | Stop reason: HOSPADM

## 2017-01-01 RX ORDER — HEPARIN 100 UNIT/ML
300-600 SYRINGE INTRAVENOUS ONCE
Status: COMPLETED | OUTPATIENT
Start: 2017-01-01 | End: 2017-01-01

## 2017-01-01 RX ORDER — HEPARIN 100 UNIT/ML
300 SYRINGE INTRAVENOUS AS NEEDED
Status: DISCONTINUED | OUTPATIENT
Start: 2017-01-01 | End: 2017-01-01 | Stop reason: HOSPADM

## 2017-01-01 RX ORDER — HEPARIN 100 UNIT/ML
300-600 SYRINGE INTRAVENOUS AS NEEDED
Status: DISPENSED | OUTPATIENT
Start: 2017-01-01 | End: 2017-01-01

## 2017-01-01 RX ORDER — DEXAMETHASONE SODIUM PHOSPHATE 100 MG/10ML
10 INJECTION INTRAMUSCULAR; INTRAVENOUS ONCE
Status: COMPLETED | OUTPATIENT
Start: 2017-01-01 | End: 2017-01-01

## 2017-01-01 RX ORDER — HEPARIN 100 UNIT/ML
500 SYRINGE INTRAVENOUS AS NEEDED
Status: DISPENSED | OUTPATIENT
Start: 2017-01-01 | End: 2017-01-01

## 2017-01-01 RX ORDER — FAMOTIDINE 10 MG/ML
20 INJECTION INTRAVENOUS ONCE
Status: DISCONTINUED | OUTPATIENT
Start: 2017-01-01 | End: 2017-01-01 | Stop reason: SDUPTHER

## 2017-01-01 RX ORDER — HEPARIN 100 UNIT/ML
600 SYRINGE INTRAVENOUS AS NEEDED
Status: ACTIVE | OUTPATIENT
Start: 2017-01-01 | End: 2017-01-01

## 2017-01-01 RX ORDER — HEPARIN 100 UNIT/ML
500 SYRINGE INTRAVENOUS AS NEEDED
Status: DISCONTINUED | OUTPATIENT
Start: 2017-01-01 | End: 2017-01-01

## 2017-01-01 RX ORDER — DIPHENHYDRAMINE HYDROCHLORIDE 50 MG/ML
50 INJECTION, SOLUTION INTRAMUSCULAR; INTRAVENOUS ONCE
Status: COMPLETED | OUTPATIENT
Start: 2017-01-01 | End: 2017-01-01

## 2017-01-01 RX ORDER — SODIUM CHLORIDE 0.9 % (FLUSH) 0.9 %
10 SYRINGE (ML) INJECTION AS NEEDED
Status: DISCONTINUED | OUTPATIENT
Start: 2017-01-01 | End: 2017-01-01 | Stop reason: HOSPADM

## 2017-01-01 RX ORDER — SODIUM CHLORIDE 0.9 % (FLUSH) 0.9 %
10 SYRINGE (ML) INJECTION EVERY 8 HOURS
Status: DISCONTINUED | OUTPATIENT
Start: 2017-01-01 | End: 2017-01-01 | Stop reason: HOSPADM

## 2017-01-01 RX ORDER — SODIUM CHLORIDE 0.9 % (FLUSH) 0.9 %
5-10 SYRINGE (ML) INJECTION AS NEEDED
Status: DISCONTINUED | OUTPATIENT
Start: 2017-01-01 | End: 2017-01-01 | Stop reason: HOSPADM

## 2017-01-01 RX ORDER — SODIUM CHLORIDE 0.9 % (FLUSH) 0.9 %
10 SYRINGE (ML) INJECTION AS NEEDED
Status: ACTIVE | OUTPATIENT
Start: 2017-01-01 | End: 2017-01-01

## 2017-01-01 RX ORDER — HEPARIN 100 UNIT/ML
300 SYRINGE INTRAVENOUS AS NEEDED
Status: DISPENSED | OUTPATIENT
Start: 2017-01-01 | End: 2017-01-01

## 2017-01-01 RX ORDER — SODIUM CHLORIDE 0.9 % (FLUSH) 0.9 %
10-20 SYRINGE (ML) INJECTION AS NEEDED
Status: ACTIVE | OUTPATIENT
Start: 2017-01-01 | End: 2017-01-01

## 2017-01-01 RX ORDER — SODIUM CHLORIDE 0.9 % (FLUSH) 0.9 %
10 SYRINGE (ML) INJECTION
Status: COMPLETED | OUTPATIENT
Start: 2017-01-01 | End: 2017-01-01

## 2017-01-01 RX ORDER — HEPARIN 100 UNIT/ML
300 SYRINGE INTRAVENOUS AS NEEDED
Status: DISCONTINUED | OUTPATIENT
Start: 2017-01-01 | End: 2017-01-01

## 2017-01-01 RX ORDER — SODIUM CHLORIDE 0.9 % (FLUSH) 0.9 %
10-20 SYRINGE (ML) INJECTION AS NEEDED
Status: DISCONTINUED | OUTPATIENT
Start: 2017-01-01 | End: 2019-10-24 | Stop reason: HOSPADM

## 2017-01-01 RX ORDER — SODIUM CHLORIDE 0.9 % (FLUSH) 0.9 %
10-40 SYRINGE (ML) INJECTION ONCE
Status: ACTIVE | OUTPATIENT
Start: 2017-01-01 | End: 2017-01-01

## 2017-01-01 RX ADMIN — SODIUM CHLORIDE 1000 ML: 900 INJECTION, SOLUTION INTRAVENOUS at 08:30

## 2017-01-01 RX ADMIN — SODIUM CHLORIDE 1000 ML: 900 INJECTION, SOLUTION INTRAVENOUS at 15:00

## 2017-01-01 RX ADMIN — Medication 20 ML: at 17:31

## 2017-01-01 RX ADMIN — SODIUM CHLORIDE, PRESERVATIVE FREE 600 UNITS: 5 INJECTION INTRAVENOUS at 08:05

## 2017-01-01 RX ADMIN — SODIUM CHLORIDE, PRESERVATIVE FREE 500 UNITS: 5 INJECTION INTRAVENOUS at 10:06

## 2017-01-01 RX ADMIN — Medication 600 UNITS: at 15:20

## 2017-01-01 RX ADMIN — Medication 20 ML: at 08:30

## 2017-01-01 RX ADMIN — Medication 10 ML: at 09:04

## 2017-01-01 RX ADMIN — SODIUM CHLORIDE, PRESERVATIVE FREE 600 UNITS: 5 INJECTION INTRAVENOUS at 09:27

## 2017-01-01 RX ADMIN — Medication 20 ML: at 08:05

## 2017-01-01 RX ADMIN — Medication 20 ML: at 12:34

## 2017-01-01 RX ADMIN — SODIUM CHLORIDE, PRESERVATIVE FREE 600 UNITS: 5 INJECTION INTRAVENOUS at 09:06

## 2017-01-01 RX ADMIN — Medication 600 UNITS: at 10:15

## 2017-01-01 RX ADMIN — Medication 10 ML: at 08:14

## 2017-01-01 RX ADMIN — SODIUM CHLORIDE 500 ML: 900 INJECTION, SOLUTION INTRAVENOUS at 09:53

## 2017-01-01 RX ADMIN — Medication 10 ML: at 15:48

## 2017-01-01 RX ADMIN — Medication 10 ML: at 08:18

## 2017-01-01 RX ADMIN — SODIUM CHLORIDE, PRESERVATIVE FREE 600 UNITS: 5 INJECTION INTRAVENOUS at 17:31

## 2017-01-01 RX ADMIN — SODIUM CHLORIDE 150 MG: 900 INJECTION, SOLUTION INTRAVENOUS at 10:00

## 2017-01-01 RX ADMIN — Medication 10 ML: at 14:45

## 2017-01-01 RX ADMIN — FAMOTIDINE 20 MG: 10 INJECTION, SOLUTION INTRAVENOUS at 11:33

## 2017-01-01 RX ADMIN — ONDANSETRON 8 MG: 2 INJECTION INTRAMUSCULAR; INTRAVENOUS at 09:34

## 2017-01-01 RX ADMIN — Medication 10 ML: at 08:15

## 2017-01-01 RX ADMIN — SODIUM CHLORIDE 1000 ML: 900 INJECTION, SOLUTION INTRAVENOUS at 08:05

## 2017-01-01 RX ADMIN — Medication 10 ML: at 15:58

## 2017-01-01 RX ADMIN — Medication 10 ML: at 09:20

## 2017-01-01 RX ADMIN — PACLITAXEL 300 MG: 6 INJECTION, SOLUTION, CONCENTRATE INTRAVENOUS at 11:25

## 2017-01-01 RX ADMIN — SODIUM CHLORIDE 1000 ML: 900 INJECTION, SOLUTION INTRAVENOUS at 09:25

## 2017-01-01 RX ADMIN — Medication 10 ML: at 16:42

## 2017-01-01 RX ADMIN — CARBOPLATIN 313.9 MG: 10 INJECTION, SOLUTION INTRAVENOUS at 14:40

## 2017-01-01 RX ADMIN — SODIUM CHLORIDE, PRESERVATIVE FREE 500 UNITS: 5 INJECTION INTRAVENOUS at 09:33

## 2017-01-01 RX ADMIN — Medication 10 ML: at 16:05

## 2017-01-01 RX ADMIN — SODIUM CHLORIDE, PRESERVATIVE FREE 600 UNITS: 5 INJECTION INTRAVENOUS at 12:35

## 2017-01-01 RX ADMIN — SODIUM CHLORIDE, PRESERVATIVE FREE 600 UNITS: 5 INJECTION INTRAVENOUS at 10:25

## 2017-01-01 RX ADMIN — SODIUM CHLORIDE 150 MG: 900 INJECTION, SOLUTION INTRAVENOUS at 11:43

## 2017-01-01 RX ADMIN — Medication 10 ML: at 09:30

## 2017-01-01 RX ADMIN — SODIUM CHLORIDE, PRESERVATIVE FREE 600 UNITS: 5 INJECTION INTRAVENOUS at 14:34

## 2017-01-01 RX ADMIN — Medication 20 ML: at 10:25

## 2017-01-01 RX ADMIN — Medication 600 UNITS: at 16:06

## 2017-01-01 RX ADMIN — SODIUM CHLORIDE 1000 ML: 900 INJECTION, SOLUTION INTRAVENOUS at 09:04

## 2017-01-01 RX ADMIN — SODIUM CHLORIDE 1000 ML: 900 INJECTION, SOLUTION INTRAVENOUS at 11:32

## 2017-01-01 RX ADMIN — Medication 600 UNITS: at 09:10

## 2017-01-01 RX ADMIN — SODIUM CHLORIDE 1000 ML: 900 INJECTION, SOLUTION INTRAVENOUS at 14:45

## 2017-01-01 RX ADMIN — DIPHENHYDRAMINE HYDROCHLORIDE 50 MG: 50 INJECTION, SOLUTION INTRAMUSCULAR; INTRAVENOUS at 09:48

## 2017-01-01 RX ADMIN — SODIUM CHLORIDE, PRESERVATIVE FREE 600 UNITS: 5 INJECTION INTRAVENOUS at 08:18

## 2017-01-01 RX ADMIN — PACLITAXEL 300 MG: 6 INJECTION, SOLUTION, CONCENTRATE INTRAVENOUS at 12:11

## 2017-01-01 RX ADMIN — Medication 10 ML: at 09:50

## 2017-01-01 RX ADMIN — DEXAMETHASONE SODIUM PHOSPHATE 10 MG: 10 INJECTION INTRAMUSCULAR; INTRAVENOUS at 09:36

## 2017-01-01 RX ADMIN — MAGNESIUM SULFATE HEPTAHYDRATE 3 G: 500 INJECTION, SOLUTION INTRAMUSCULAR; INTRAVENOUS at 09:46

## 2017-01-01 RX ADMIN — Medication 500 UNITS: at 08:15

## 2017-01-01 RX ADMIN — SODIUM CHLORIDE, PRESERVATIVE FREE 600 UNITS: 5 INJECTION INTRAVENOUS at 09:30

## 2017-01-01 RX ADMIN — Medication 20 ML: at 15:00

## 2017-01-01 RX ADMIN — SODIUM CHLORIDE, PRESERVATIVE FREE 600 UNITS: 5 INJECTION INTRAVENOUS at 08:15

## 2017-01-01 RX ADMIN — Medication 10 ML: at 15:36

## 2017-01-01 RX ADMIN — CARBOPLATIN 275 MG: 10 INJECTION, SOLUTION INTRAVENOUS at 15:04

## 2017-01-01 RX ADMIN — DIPHENHYDRAMINE HYDROCHLORIDE 50 MG: 50 INJECTION, SOLUTION INTRAMUSCULAR; INTRAVENOUS at 11:35

## 2017-01-01 RX ADMIN — DIATRIZOATE MEGLUMINE AND DIATRIZOATE SODIUM 10 ML: 660; 100 LIQUID ORAL; RECTAL at 09:50

## 2017-01-01 RX ADMIN — Medication 20 ML: at 09:20

## 2017-01-01 RX ADMIN — Medication 10 ML: at 09:26

## 2017-01-01 RX ADMIN — SODIUM CHLORIDE, PRESERVATIVE FREE 600 UNITS: 5 INJECTION INTRAVENOUS at 16:42

## 2017-01-01 RX ADMIN — Medication 10 ML: at 10:06

## 2017-01-01 RX ADMIN — DEXAMETHASONE SODIUM PHOSPHATE 10 MG: 10 INJECTION INTRAMUSCULAR; INTRAVENOUS at 11:37

## 2017-01-01 RX ADMIN — Medication 10 ML: at 15:55

## 2017-01-01 RX ADMIN — Medication 20 ML: at 14:34

## 2017-01-01 RX ADMIN — SODIUM CHLORIDE 1000 ML: 900 INJECTION, SOLUTION INTRAVENOUS at 15:37

## 2017-01-01 RX ADMIN — SODIUM CHLORIDE 1000 ML: 900 INJECTION, SOLUTION INTRAVENOUS at 08:15

## 2017-01-01 RX ADMIN — ONDANSETRON 8 MG: 2 INJECTION INTRAMUSCULAR; INTRAVENOUS at 11:33

## 2017-01-01 RX ADMIN — Medication 20 ML: at 09:33

## 2017-01-01 RX ADMIN — Medication 20 ML: at 10:15

## 2017-01-01 RX ADMIN — Medication 20 ML: at 09:06

## 2017-01-01 RX ADMIN — SODIUM CHLORIDE 500 ML: 900 INJECTION, SOLUTION INTRAVENOUS at 11:40

## 2017-01-01 RX ADMIN — Medication 10 ML: at 11:31

## 2017-01-01 RX ADMIN — SODIUM CHLORIDE, PRESERVATIVE FREE 500 UNITS: 5 INJECTION INTRAVENOUS at 08:30

## 2017-01-01 RX ADMIN — Medication 20 ML: at 09:10

## 2017-01-01 RX ADMIN — Medication 30 ML: at 11:05

## 2017-01-01 RX ADMIN — FAMOTIDINE 20 MG: 10 INJECTION, SOLUTION INTRAVENOUS at 09:43

## 2017-01-01 RX ADMIN — Medication 10 ML: at 08:05

## 2017-01-01 RX ADMIN — SODIUM CHLORIDE, PRESERVATIVE FREE 600 UNITS: 5 INJECTION INTRAVENOUS at 16:00

## 2017-01-01 RX ADMIN — INFLUENZA VIRUS VACCINE 0.5 ML: 15; 15; 15; 15 SUSPENSION INTRAMUSCULAR at 11:46

## 2017-01-01 RX ADMIN — Medication 10 ML: at 16:43

## 2017-01-01 RX ADMIN — DIATRIZOATE MEGLUMINE AND DIATRIZOATE SODIUM 10 ML: 660; 100 LIQUID ORAL; RECTAL at 11:05

## 2017-01-01 RX ADMIN — Medication 20 ML: at 15:20

## 2017-02-28 ENCOUNTER — HOSPITAL ENCOUNTER (OUTPATIENT)
Dept: PET IMAGING | Age: 77
Discharge: HOME | End: 2017-02-28
Payer: MEDICARE

## 2017-02-28 DIAGNOSIS — C34.90 LUNG CANCER (HCC): ICD-10-CM

## 2017-02-28 PROCEDURE — 74011636320 HC RX REV CODE- 636/320: Performed by: RADIOLOGY

## 2017-02-28 PROCEDURE — A9552 F18 FDG: HCPCS

## 2017-02-28 RX ORDER — SODIUM CHLORIDE 0.9 % (FLUSH) 0.9 %
10 SYRINGE (ML) INJECTION
Status: COMPLETED | OUTPATIENT
Start: 2017-02-28 | End: 2017-02-28

## 2017-02-28 RX ADMIN — Medication 10 ML: at 11:26

## 2017-02-28 RX ADMIN — DIATRIZOATE MEGLUMINE AND DIATRIZOATE SODIUM 10 ML: 660; 100 LIQUID ORAL; RECTAL at 11:26

## 2017-03-08 ENCOUNTER — HOSPITAL ENCOUNTER (OUTPATIENT)
Dept: LAB | Age: 77
Discharge: HOME | End: 2017-03-08
Payer: MEDICARE

## 2017-03-08 DIAGNOSIS — Z85.118 HISTORY OF LUNG CANCER: ICD-10-CM

## 2017-03-08 LAB
ALBUMIN SERPL BCP-MCNC: 3.9 G/DL (ref 3.2–4.6)
ALBUMIN/GLOB SERPL: 1 {RATIO} (ref 1.2–3.5)
ALP SERPL-CCNC: 86 U/L (ref 50–136)
ALT SERPL-CCNC: 41 U/L (ref 12–65)
ANION GAP BLD CALC-SCNC: 7 MMOL/L (ref 7–16)
AST SERPL W P-5'-P-CCNC: 25 U/L (ref 15–37)
BASOPHILS # BLD AUTO: 0.1 K/UL (ref 0–0.2)
BASOPHILS # BLD: 1 % (ref 0–2)
BILIRUB SERPL-MCNC: 0.7 MG/DL (ref 0.2–1.1)
BUN SERPL-MCNC: 12 MG/DL (ref 8–23)
CALCIUM SERPL-MCNC: 9.2 MG/DL (ref 8.3–10.4)
CHLORIDE SERPL-SCNC: 104 MMOL/L (ref 98–107)
CO2 SERPL-SCNC: 29 MMOL/L (ref 23–32)
CREAT SERPL-MCNC: 1.59 MG/DL (ref 0.8–1.5)
DIFFERENTIAL METHOD BLD: ABNORMAL
EOSINOPHIL # BLD: 0.1 K/UL (ref 0–0.8)
EOSINOPHIL NFR BLD: 1 % (ref 0.5–7.8)
ERYTHROCYTE [DISTWIDTH] IN BLOOD BY AUTOMATED COUNT: 12.9 % (ref 11.9–14.6)
GLOBULIN SER CALC-MCNC: 3.8 G/DL (ref 2.3–3.5)
GLUCOSE SERPL-MCNC: 115 MG/DL (ref 65–100)
HCT VFR BLD AUTO: 50.6 % (ref 41.1–50.3)
HGB BLD-MCNC: 17.1 G/DL (ref 13.6–17.2)
LYMPHOCYTES # BLD AUTO: 19 % (ref 13–44)
LYMPHOCYTES # BLD: 1.4 K/UL (ref 0.5–4.6)
MCH RBC QN AUTO: 32.8 PG (ref 26.1–32.9)
MCHC RBC AUTO-ENTMCNC: 33.8 G/DL (ref 31.4–35)
MCV RBC AUTO: 97.1 FL (ref 79.6–97.8)
MONOCYTES # BLD: 0.7 K/UL (ref 0.1–1.3)
MONOCYTES NFR BLD AUTO: 9 % (ref 4–12)
NEUTS SEG # BLD: 5.2 K/UL (ref 1.7–8.2)
NEUTS SEG NFR BLD AUTO: 71 % (ref 43–78)
NRBC # BLD: 0 K/UL (ref 0–0.2)
PLATELET # BLD AUTO: 233 K/UL (ref 150–450)
PMV BLD AUTO: 8.6 FL (ref 10.8–14.1)
POTASSIUM SERPL-SCNC: 4.3 MMOL/L (ref 3.5–5.1)
PROT SERPL-MCNC: 7.7 G/DL (ref 6.3–8.2)
RBC # BLD AUTO: 5.21 M/UL (ref 4.23–5.67)
SODIUM SERPL-SCNC: 140 MMOL/L (ref 136–145)
WBC # BLD AUTO: 7.4 K/UL (ref 4.3–11.1)

## 2017-03-08 PROCEDURE — 36415 COLL VENOUS BLD VENIPUNCTURE: CPT | Performed by: INTERNAL MEDICINE

## 2017-03-08 PROCEDURE — 85025 COMPLETE CBC W/AUTO DIFF WBC: CPT | Performed by: INTERNAL MEDICINE

## 2017-03-08 PROCEDURE — 80053 COMPREHEN METABOLIC PANEL: CPT | Performed by: INTERNAL MEDICINE

## 2017-04-11 ENCOUNTER — HOSPITAL ENCOUNTER (OUTPATIENT)
Dept: CT IMAGING | Age: 77
Discharge: HOME | End: 2017-04-11
Attending: INTERNAL MEDICINE
Payer: MEDICARE

## 2017-04-11 VITALS — BODY MASS INDEX: 26.68 KG/M2 | HEIGHT: 72 IN | WEIGHT: 197 LBS

## 2017-04-11 DIAGNOSIS — R91.8 ABNORMAL CT SCAN OF LUNG: ICD-10-CM

## 2017-04-11 LAB — CREAT BLD-MCNC: 1.4 MG/DL (ref 0.6–1)

## 2017-04-11 PROCEDURE — 74011000258 HC RX REV CODE- 258: Performed by: INTERNAL MEDICINE

## 2017-04-11 PROCEDURE — 71260 CT THORAX DX C+: CPT

## 2017-04-11 PROCEDURE — 74011636320 HC RX REV CODE- 636/320: Performed by: INTERNAL MEDICINE

## 2017-04-11 PROCEDURE — 82565 ASSAY OF CREATININE: CPT

## 2017-04-11 RX ORDER — SODIUM CHLORIDE 0.9 % (FLUSH) 0.9 %
10 SYRINGE (ML) INJECTION
Status: COMPLETED | OUTPATIENT
Start: 2017-04-11 | End: 2017-04-11

## 2017-04-11 RX ADMIN — Medication 10 ML: at 08:36

## 2017-04-11 RX ADMIN — SODIUM CHLORIDE 100 ML: 900 INJECTION, SOLUTION INTRAVENOUS at 08:36

## 2017-04-11 RX ADMIN — IOPAMIDOL 100 ML: 755 INJECTION, SOLUTION INTRAVENOUS at 08:36

## 2017-04-13 ENCOUNTER — PATIENT OUTREACH (OUTPATIENT)
Dept: CASE MANAGEMENT | Age: 77
End: 2017-04-13

## 2017-04-13 NOTE — ACP (ADVANCE CARE PLANNING)
Pt completed a CT scan per the recommendations from tumor board. Dr. Jose Alejandro madrigal looked at the scan and wants to biopsy the 10L lymph node. Spoke with Sol Romano from [facility] who states she will schedule this and contact the pt. Navigator will follow results and arrange appts as needed.

## 2017-04-18 ENCOUNTER — PATIENT OUTREACH (OUTPATIENT)
Dept: CASE MANAGEMENT | Age: 77
End: 2017-04-18

## 2017-04-18 NOTE — ACP (ADVANCE CARE PLANNING)
Spoke with Rebel Reynolds from [facility] on Thurday 4/13 to discuss CT results and next plan of care. Melissa Ardon spoke with Dr. Gary Rodriguez who felt pt needed a bronch due to CT results. Followed up with Melissa Ardon today to inquire on bronch appt. States there is not an opening at this time but she is still working on getting pt scheduled. Melissa Ardon to call pt once this is scheduled and notify navigator of appt. Will continue to follow.

## 2017-05-17 ENCOUNTER — HOSPITAL ENCOUNTER (OUTPATIENT)
Age: 77
Setting detail: OUTPATIENT SURGERY
Discharge: HOME | End: 2017-05-17
Attending: INTERNAL MEDICINE | Admitting: INTERNAL MEDICINE
Payer: MEDICARE

## 2017-05-17 VITALS
DIASTOLIC BLOOD PRESSURE: 71 MMHG | HEART RATE: 72 BPM | HEIGHT: 72 IN | TEMPERATURE: 97.1 F | OXYGEN SATURATION: 90 % | BODY MASS INDEX: 25.74 KG/M2 | RESPIRATION RATE: 14 BRPM | WEIGHT: 190.04 LBS | SYSTOLIC BLOOD PRESSURE: 153 MMHG

## 2017-05-17 DIAGNOSIS — C34.12 MALIGNANT NEOPLASM OF UPPER LOBE OF LEFT LUNG (HCC): ICD-10-CM

## 2017-05-17 PROCEDURE — 88173 CYTOPATH EVAL FNA REPORT: CPT | Performed by: INTERNAL MEDICINE

## 2017-05-17 PROCEDURE — 74011250636 HC RX REV CODE- 250/636: Performed by: INTERNAL MEDICINE

## 2017-05-17 PROCEDURE — 31652 BRONCH EBUS SAMPLNG 1/2 NODE: CPT | Performed by: INTERNAL MEDICINE

## 2017-05-17 PROCEDURE — 76040000025: Performed by: INTERNAL MEDICINE

## 2017-05-17 PROCEDURE — 77030037590 HC NDL ASPIR BIOP OCOA -D: Performed by: INTERNAL MEDICINE

## 2017-05-17 PROCEDURE — 88305 TISSUE EXAM BY PATHOLOGIST: CPT | Performed by: INTERNAL MEDICINE

## 2017-05-17 PROCEDURE — 99152 MOD SED SAME PHYS/QHP 5/>YRS: CPT | Performed by: INTERNAL MEDICINE

## 2017-05-17 PROCEDURE — 74011250636 HC RX REV CODE- 250/636

## 2017-05-17 PROCEDURE — 99153 MOD SED SAME PHYS/QHP EA: CPT | Performed by: INTERNAL MEDICINE

## 2017-05-17 PROCEDURE — 77030009046 HC CATH BRNCH BLLN OCOA -B: Performed by: INTERNAL MEDICINE

## 2017-05-17 PROCEDURE — 88177 CYTP FNA EVAL EA ADDL: CPT | Performed by: INTERNAL MEDICINE

## 2017-05-17 PROCEDURE — 77030003406 HC NDL ASPIR BIOP OCOA -C: Performed by: INTERNAL MEDICINE

## 2017-05-17 PROCEDURE — 88172 CYTP DX EVAL FNA 1ST EA SITE: CPT | Performed by: INTERNAL MEDICINE

## 2017-05-17 PROCEDURE — 74011000250 HC RX REV CODE- 250: Performed by: INTERNAL MEDICINE

## 2017-05-17 PROCEDURE — 77030012699 HC VLV SUC CNTRL OCOA -A: Performed by: INTERNAL MEDICINE

## 2017-05-17 RX ORDER — SODIUM CHLORIDE 9 MG/ML
1000 INJECTION, SOLUTION INTRAVENOUS CONTINUOUS
Status: DISCONTINUED | OUTPATIENT
Start: 2017-05-17 | End: 2017-05-17 | Stop reason: HOSPADM

## 2017-05-17 RX ORDER — FENTANYL CITRATE 50 UG/ML
50 INJECTION, SOLUTION INTRAMUSCULAR; INTRAVENOUS
Status: DISCONTINUED | OUTPATIENT
Start: 2017-05-17 | End: 2017-05-17 | Stop reason: HOSPADM

## 2017-05-17 RX ORDER — MIDAZOLAM HYDROCHLORIDE 1 MG/ML
.25-5 INJECTION, SOLUTION INTRAMUSCULAR; INTRAVENOUS
Status: DISCONTINUED | OUTPATIENT
Start: 2017-05-17 | End: 2017-05-17 | Stop reason: HOSPADM

## 2017-05-17 RX ORDER — LIDOCAINE HYDROCHLORIDE 40 MG/ML
SOLUTION TOPICAL
Status: COMPLETED | OUTPATIENT
Start: 2017-05-17 | End: 2017-05-17

## 2017-05-17 RX ORDER — LIDOCAINE HYDROCHLORIDE 20 MG/ML
JELLY TOPICAL
Status: COMPLETED | OUTPATIENT
Start: 2017-05-17 | End: 2017-05-17

## 2017-05-17 RX ADMIN — SODIUM CHLORIDE 1000 ML: 900 INJECTION, SOLUTION INTRAVENOUS at 11:04

## 2017-05-17 RX ADMIN — LIDOCAINE HYDROCHLORIDE: 20 JELLY TOPICAL at 11:20

## 2017-05-17 RX ADMIN — FENTANYL CITRATE 50 MCG: 50 INJECTION, SOLUTION INTRAMUSCULAR; INTRAVENOUS at 11:09

## 2017-05-17 RX ADMIN — MIDAZOLAM HYDROCHLORIDE 2 MG: 1 INJECTION, SOLUTION INTRAMUSCULAR; INTRAVENOUS at 11:09

## 2017-05-17 RX ADMIN — FENTANYL CITRATE 50 MCG: 50 INJECTION, SOLUTION INTRAMUSCULAR; INTRAVENOUS at 11:20

## 2017-05-17 RX ADMIN — LIDOCAINE HYDROCHLORIDE: 40 SOLUTION TOPICAL at 11:20

## 2017-05-17 NOTE — DISCHARGE INSTRUCTIONS
RESPIRATORY CARE - BRONCHOSCOPY/EBUS/ BIOPSIES- DISCHARGE INSTRUCTIONS      You received a lot of numbing medication for your throat and nose, and you also received medication to make you sleepy during your procedure. Because of this and because the bronchoscopy may have irritated your airways, we ask that you follow these directions:    1. Do not eat or drink until  1230pm.   After that, you may have what you please. You may want to try some liquids first, because your throat may be a little sore. 2. Medication may cause drowsiness for several hours, therefore, do not drive or operate machinery for remainder of the day. No alcohol today. 3. You may cough up more mucus than usual and you may see some blood, but this is expected and should subside by the following day. 4. If severe throat irritation, coughing, or bleeding continue, call your doctor. 5.         If you run a fever greater than 102, take tylenol and motrin then call [facility] at [phone]. 6.         Dr. Magdalena Cronin has asked that you:                A. Call the doctor's office at [phone] for any questions or problems that may occur                B. See Dr. Di Ledbetter in the office on your previously scheduled appointment. Discharge Medications:  Any additional instructions:  Resume all medication as before procedure using caution with any pain medication.     Instructions given to [address], [address] and other family members  Instructions given by:

## 2017-05-17 NOTE — IP AVS SNAPSHOT
[address] 
[phone] Patient: Yanelis Davis MRN: [id number] QXB:[id number] You are allergic to the following No active allergies Recent Documentation Height Weight BMI Smoking Status 1.829 m 86.2 kg 25.77 kg/m2 Former Smoker Emergency Contacts Name Discharge Info Relation Home Work Mobile Josesito Thompson  Other Relative [6] Tari Castillo CAREGIVER [3] Daughter [21] [phone] ManuelLouieМария Billingsley DISCHARGE CAREGIVER [3] Daughter [21] [phone] Karishma Ng DISCHARGE CAREGIVER [3] Sister [23] [phone] Amaris IIINeville \"Chip\" DISCHARGE CAREGIVER [3] Son [22] [phone] About your hospitalization You were admitted on:  May 17, 2017 You last received care in the:  [facility] You were discharged on:  May 17, 2017 Unit phone number:  [phone] Why you were hospitalized Your primary diagnosis was:  Not on File Providers Seen During Your Hospitalizations Provider Role Specialty Primary office phone Reynaldo Vega MD Attending Provider Pulmonary Disease [phone] Your Primary Care Physician (PCP) Primary Care Physician Office Phone Office Fax [address] [phone] Follow-up Information None Your Appointments Monday June 12, 2017 10:10 AM EDT Follow Up with Carlos A Romano MD  
[address] ([address] [place] [address]) [address] [place] [address]  
[phone] Current Discharge Medication List  
  
ASK your doctor about these medications Dose & Instructions Dispensing Information Comments Morning Noon Evening Bedtime  
 albuterol 90 mcg/actuation inhaler Commonly known as:  VENTOLIN HFA Your last dose was: Your next dose is: Dose:  2 Puff Take 2 Puffs by inhalation four (4) times daily. Quantity:  3 Inhaler Refills:  3  
     
   
   
   
  
 amLODIPine 10 mg tablet Commonly known as:  Hillary Rafter Your last dose was: Your next dose is: TAKE 1 TABLET EVERY DAY  FOR  HYPERTENSION Quantity:  90 Tab Refills:  3 ANORO ELLIPTA 62.5-25 mcg/actuation inhaler Generic drug:  umeclidinium-vilanterol Your last dose was: Your next dose is: TAKE 1 PUFF BY MOUTH EVERY DAY Quantity:  1 Inhaler Refills:  11 PATIENT REQUESTED REFILLS ON THIS MEDICATION  
    
   
   
   
  
 aspirin 81 mg chewable tablet Your last dose was: Your next dose is:    
   
   
 Dose:  81 mg Take 81 mg by mouth every morning. Refills:  0  
     
   
   
   
  
 losartan 100 mg tablet Commonly known as:  COZAAR Your last dose was: Your next dose is: TAKE 1 TABLET EVERY MORNING Quantity:  90 Tab Refills:  3  
     
   
   
   
  
 rosuvastatin 10 mg tablet Commonly known as:  CRESTOR Your last dose was: Your next dose is:    
   
   
 Dose:  10 mg Take 1 Tab by mouth nightly. Quantity:  90 Tab Refills:  3 Discharge Instructions RESPIRATORY CARE - BRONCHOSCOPY/EBUS/ BIOPSIES- DISCHARGE INSTRUCTIONS You received a lot of numbing medication for your throat and nose, and you also received medication to make you sleepy during your procedure. Because of this and because the bronchoscopy may have irritated your airways, we ask that you follow these directions: 1. Do not eat or drink until  1230pm.   After that, you may have what you please. You may want to try some liquids first, because your throat may be a little sore.  
 
2. Medication may cause drowsiness for several hours, therefore, do not drive or operate machinery for remainder of the day. No alcohol today. 3. You may cough up more mucus than usual and you may see some blood, but this is expected and should subside by the following day. 4. If severe throat irritation, coughing, or bleeding continue, call your doctor. 5.         If you run a fever greater than 102, take tylenol and motrin then call [facility] at [phone]. 6.         Dr. Brit Osorio has asked that you: A. Call the doctor's office at [phone] for any questions or problems that may occur Jeb Cheyr Both in the office on your previously scheduled appointment. Discharge Medications: 
Any additional instructions:  Resume all medication as before procedure using caution with any pain medication. Instructions given to [address] and other family members Instructions given by:   
 
Discharge Orders None Introducing [facility]! Axel Jarquin introduces [email or web address] patient portal. Now you can access parts of your medical record, email your doctor's office, and request medication refills online. 1. In your internet browser, go to [email or web address] 2. Click on the First Time User? Click Here link in the Sign In box. You will see the New Member Sign Up page. 3. Enter your [email or web address] Access Code exactly as it appears below. You will not need to use this code after youve completed the sign-up process. If you do not sign up before the expiration date, you must request a new code. · [email or web address] Access Code: [email or web address] Expires: 6/6/2017  2:09 PM 
 
4. Enter the last four digits of your Social Security Number (xxxx) and Date of Birth (mm/dd/yyyy) as indicated and click Submit. You will be taken to the next sign-up page. 5. Create a [email or web address] ID. This will be your [email or web address] login ID and cannot be changed, so think of one that is secure and easy to remember. 6. Create a [employer] password. You can change your password at any time. 7. Enter your Password Reset Question and Answer. This can be used at a later time if you forget your password. 8. Enter your e-mail address. You will receive e-mail notification when new information is available in [address] 9. Click Sign Up. You can now view and download portions of your medical record. 10. Click the Download Summary menu link to download a portable copy of your medical information. If you have questions, please visit the Frequently Asked Questions section of the [employer] website. Remember, [employer] is NOT to be used for urgent needs. For medical emergencies, dial 911. Now available from your iPhone and Android! General Information Please provide this summary of care documentation to your next provider. Patient Signature:  ____________________________________________________________ Date:  ____________________________________________________________  
  
Farfan Ledger Provider Signature:  ____________________________________________________________ Date:  ____________________________________________________________

## 2017-05-17 NOTE — PROCEDURES
PROCEDURE  Bronchoscopy with Endobronchial Ultrasound Guided Fine Needle Aspiration Of Medistinal Lymph nodes and airway inspection. INDICATION   Diagnosis of Mediastinal Lymphadenopathy in a patient with history of stage I HOWARD cancer, s/p XRT      POST OP DIAGNOSIS:  Station 10L was biopsied and positive for malignancy on DEBBIE. Based on CT chest/PET CT / and EBUS pt is a STAGE recurrent lung cancer. ANESTHESIA  Concious sedation with: Fentanyl 100 mcg IV; Versed 2 mg IV; Lidocaine 200 mg to tracheo-bronchial tree and vocal cords    AIRWAY INSPECTION  After obtaining informed consent, using a bite block, an Olympus Q 180 viedeo bronchoscope was  introduced into the trachea through the vocal cords without complications. RIGHT  LOCATION NORM/ABNORMAL DESCRIPTION   VOCAL CORDS NL    TRACHEA NL    CASSANDRA NL    RMSB NL    RUL NL    BI NL    RML NL    SUP SEGM RLL NL    MED BASAL NL    ANTERIOR BASAL NL    LATERAL BASAL NL    POSTERIOR BASAL NL                                               LEFT  LOCATION NORMAL/ABNORMAL TYPE   LMSB NL    HOWARD NL    LINGULA NL    SUPERIOR DIVISION NL    SUPERIOR SEG LLL NL    JOVANNA-MEDIAL LLL NL    LATERAL LLL NL    POSTERIOR LLL NL                         EBUS  After completing the airway inspection an Olympus  F EBUS bronchoscope was introduced into the trachea through the vocal chords without complication. The balloon was inflated with saline and a mediastinal inspection commenced:      STATION SIZE IN CM   10L 1.2/1.0         After identifying targets the following samples were obtained:    STATION PASS# LYMPHOCYTES MALIGNANT ATYPICAL GRANULOMA NONDGNSTC   10L 1 + - ++ -     2 - - - - blood    3 - ++++ - -              4 -- -- -- -- In toto for IHC    5 -- -- -- --     6 -- -- -- --                     The procedure was completed without complication and the patient tolerated the procedure well.     EBL: 5 ml    Christiano Galeana MD.

## 2017-05-17 NOTE — H&P
Progress Notes  Encounter Date: 3/30/2017  Remy Escobedo MD   Oncology   Expand All Collapse All    []Hide copied text  Data Source: Patient, [facility] record.     3/30/2017     1:25 PM     [address], [address] [facility] [id number]     65 y.o.        Patient Encounter: [address] Visit     Cancer Diagnosis:  HOWARD Pet avid pulmonary nodule  Stage:  1 if malignant  Performance Status:  0  Code Status:  Not discussed  Onc History (Copied from prior):   67 male, h/o COPD (not on oxygen), ecent underwent imaging for carotid atheroscerotic disease and was found to have a HOWARD nodule. This was followed up by PET scan which showed increased uptake in nodule as well as RT protid gland. He has seen ENT Dr Alexander Hunt, and he felt the parotid lesion is likely a benign tumor. Patient subsequently underwent broncoscopy w/ LN and nodue biopsy were -ve. His case was discussed a ttumor board, and he is not considered a sugical candidate. He see's rad-oncology tomorrow   11/12/15: No new complaints. Denies any new lumps, bumps, pains, or B symptoms. Fair po intake. Current Therapy:  -  Response to Current Therapy:  - IA  Prior Therapies:  - SPS to pulm HOWARD lesion. Hospice Referral:  -  Interval History:  3/30/17: Clinically doing well @BARCODE(Error - No data available. )@ was discussed at tumor board, recommendation to get dedicated chest CT for better evaluation at which point pulmonology will make decision regarding repeat bronch w attempt at biopsy or simply continued monitoring w repeat scan in 3 months.  Patient in agreement w plan.      NCCN Distress Score:  -     REVIEW OF SYSTEMS:  As mentioned above, all other systems were reviewed in full and are negative.          Past Medical History:   Diagnosis Date    Actinic keratosis      Benign neoplasm of skin, site unspecified      Calculus of kidney      Cancer (HCC)       lung-radiation    Chronic kidney disease       elevated creatinine 1.36    Chronic obstructive pulmonary disease ([facility] [address]       \" lungs work at approx 30%, controlled by inhalers\"    Enlargement of lymph nodes      Hypertrophy of prostate without urinary obstruction and other lower urinary tract symptoms (LUTS)      Impaired fasting glucose      Lung mass       left lung mass suspicious of lung ca    Other and unspecified hyperlipidemia      Tobacco use disorder      Type II or unspecified type diabetes mellitus without mention of complication, not stated as uncontrolled       \"borderline\", does not chk at home, not recommended, today's blood glucose was 106 after eating    Unspecified essential hypertension       controlled by losartan and amlodipine               Past Surgical History:   Procedure Laterality Date    CHEST SURGERY PROCEDURE UNLISTED         Lung Biopsy x2    HX CAROTID ENDARTERECTOMY Left 8/6/2015    HX CATARACT REMOVAL Left 1952    HX HERNIA REPAIR   2010     multiple, right ,left nad in the middle    HX OTHER SURGICAL        HX UROLOGICAL   1990's     kidney stone ext    HX UROLOGICAL         TURP Dr. Angelita Horner                Current Outpatient Prescriptions   Medication Sig Dispense Refill    rosuvastatin (CRESTOR) 10 mg tablet Take 1 Tab by mouth nightly. 90 Tab 3    losartan (COZAAR) 100 mg tablet TAKE 1 TABLET EVERY MORNING 90 Tab 3    amLODIPine (NORVASC) 10 mg tablet TAKE 1 TABLET EVERY DAY FOR HYPERTENSION 90 Tab 3    ANORO ELLIPTA 62.5-25 mcg/actuation inhaler TAKE 1 PUFF BY MOUTH EVERY DAY 1 Inhaler 11    albuterol (VENTOLIN HFA) 90 mcg/actuation inhaler Take 2 Puffs by inhalation four (4) times daily.  3 Inhaler 3    aspirin 81 mg chewable tablet Take 81 mg by mouth every morning.              Social History                Social History    Marital status:        Spouse name: N/A    Number of children: 3    Years of education: N/A           Occupational History    Retired                Social History Main Topics    Smoking status: Former Smoker       Packs/day: 1.50       Years: 43.00       Types: Cigarettes       Quit date: 2015    Smokeless tobacco: Never Used    Alcohol use 0.0 oz/week        0 Standard drinks or equivalent per week          Comment: 7 drinks per week    Drug use: No    Sexual activity: Not Asked           Other Topics Concern    None          Social History Narrative     He used to work as a manager, no known history of exposure to asbestos or tuberculosis of the best of his knowledge. He has no pets and no history of exposure to birds.                   Family History   Problem Relation Age of Onset    Stroke Mother      Heart Disease Father 66        of MI    Diabetes Brother           No Known Allergies     PHYSICAL EXAMINATION:  General Appearance: Healthy appearing patient in no acute distress  Vitals reviewed. Visit Vitals    /77  Comment: standing    Pulse 74    Temp 97.3 °F (36.3 °C)    Resp 18    Ht 6' (1.829 m)    Wt 197 lb 4.8 oz (89.5 kg)    SpO2 91%    BMI 26.76 kg/m2      HEENT: No oral or pharyngeal masses, ulceration or thrush noted, no sinus tenderness. Neck is supple with no thyromegaly or JVD noted. Lymph Nodes: No lymphadenopathy noted in the occipital, pre and post auricular, cervical, supra and infraclavicular, axillary, epitrochlear, inguinal, and popliteal region. Breasts: No palpable masses, nipple discharge or skin retraction  Lungs/Thorax: Clear to auscultation, no accessory muscles of respiration being used. Heart: Regular rate and rhythm, normal S1, S2, no appreciable murmurs, rubs, gallops  Abdomen: Soft, nontender, bowel sounds present, no appreciable hepatosplenomegaly, no palpable masses  Extremeties: Good pulses bilaterally, no peripheral edema. Skin: Normal skin tone with no rash, petechiae, ecchymosis noted.   Musculoskeletal: No pain on palpation over bony prominence, no edema, no evidence of gout, no joint or bony deformity  Neurologic: Grossly intact     LABS/IMAGING:           Lab Results   Component Value Date/Time     WBC 7.4 03/08/2017 01:24 PM     HGB 17.1 03/08/2017 01:24 PM     HCT 50.6 03/08/2017 01:24 PM     PLATELET 087 40/96/9325 01:24 PM     MCV 97.1 03/08/2017 01:24 PM               Lab Results   Component Value Date/Time     Sodium 140 03/08/2017 01:24 PM     Potassium 4.3 03/08/2017 01:24 PM     Chloride 104 03/08/2017 01:24 PM     CO2 29 03/08/2017 01:24 PM     Anion gap 7 03/08/2017 01:24 PM     Glucose 115 03/08/2017 01:24 PM     BUN 12 03/08/2017 01:24 PM     Creatinine 1.59 03/08/2017 01:24 PM     BUN/Creatinine ratio 9 12/08/2016 10:53 AM     GFR est AA 54 03/08/2017 01:24 PM     GFR est non-AA 45 03/08/2017 01:24 PM     Calcium 9.2 03/08/2017 01:24 PM     AST (SGOT) 25 03/08/2017 01:24 PM     Alk. phosphatase 86 03/08/2017 01:24 PM     Protein, total 7.7 03/08/2017 01:24 PM     Albumin 3.9 03/08/2017 01:24 PM     Globulin 3.8 03/08/2017 01:24 PM     A-G Ratio 1.0 03/08/2017 01:24 PM     ALT (SGPT) 41 03/08/2017 01:24 PM            Above results reviewed with patient.      ASSESSMENT:  67 male, h/o COPD (not on oxygen), recently underwent imaging for carotid atheroscerotic disease and was found to have a HOWARD nodule. CT chest 7/17/15 revealed a 9 mm HOWARD nodule. This was followed up by PET scan 8/18/15 which showed increased uptake in the HOWARD nodule measuring 8mm as well as a RT parotid gland 14 mm lesion. He has seen ENT Dr Evert Santana, who felt the parotid lesion is likely a benign tumor. Patient subsequently underwent broncoscopy w/ LN and nodue biopsy which were -ve. His case was discussed a tumor board, and he is not considered a candidate for lobectomy based on PFT results. He has seen pulmonology w/ options including wedge resection vs SRS for a likely Stage 1A lung cancer (cT1aN0). He see's rad-oncology tomorrow.      11/12/15: No new complaints. Denies any new lumps, bumps, pains, or B symptoms. Fair po intake.  Case discussed at tumor board: recommendation to consider repeat biopsy and fiducial placement: depending on results of re-biopsy to consider repeat scans vs proceeding w SBRT. Brain MRI was -ve.      12/7/15: repeat biopsy -ve. fiducual placed. Repeat PET scan w stable disease though linear density extending anteriorly. To see rad-onc in near future.      3/2/16: No new complaints. Quit smoking 8/5/15. S/p SBRT 12/15. CT 2/16 w smaller lesion. Pet ordered for 5/16 by rad-oncology.      6/9/16: doing well. PET scan 5/26/16 w stable nodule (OK): continue to follow, repeat pet ordered by rad onc in 6 months time. 12/16/16: No new complaints. PET scan 12/16 w persistent HOWARD lesion response, however slight increased uptake in hilar region, possibly reactive, however early malignancy recurrence cannot be ruled out. Has already seen by rad-onc w/ plans to simply repeat PET scan 3 months from last (02/17).   2/8/17: Case discussed at tumor board: Repeat PET scan showed persistent LT hilar uptake (slightly increased), as well as mild Rt axillary LN uptake (which on retrospect does not appear new, & is stable). Agreement to pursue biopsy of FDG avid hilar region w EBUS. Axillary LN uptake was felt clinically insignificant but will need to be monitored w scans down the line. Will make referral to pulmonology for EBUS (felt to be a difficult to get to region by EBUS but will try).    3/30/17: Clinically doing well @BARCODE(Error - No data available. )@ was discussed at tumor board, recommendation to get dedicated chest CT for better evaluation at which point pulmonology will make decision regarding repeat bronch w attempt at biopsy or simply continued monitoring w repeat scan in 3 months. Patient in agreement w plan.      PLAN:  - as above. Advised to f/u w pulmonology.   - Follows w ENT for parotid gland lesion as needed.     RTC in 1 month post scan.       I spent over 20 minutes with the patient out of which more than 12 were spent in face to face counseling.     Nav Carias MD  [address] Stuart Rodriguez [address]  Office : [phone]  Fax : [phone]                     Electronically signed by Nav Carias MD at 03/31/17 0915     Date of Surgery Update:  Ke Izquierdo was seen and examined. History and physical has been reviewed. The patient has been examined.  There have been no significant clinical changes since the completion of the originally dated History and Physical.    Signed By: Chandler Carlson MD     May 17, 2017 2:06 PM

## 2017-05-23 ENCOUNTER — PATIENT OUTREACH (OUTPATIENT)
Dept: CASE MANAGEMENT | Age: 77
End: 2017-05-23

## 2017-05-23 ENCOUNTER — HOSPITAL ENCOUNTER (OUTPATIENT)
Dept: LAB | Age: 77
Discharge: HOME | End: 2017-05-23
Payer: MEDICARE

## 2017-05-23 DIAGNOSIS — C34.12 MALIGNANT NEOPLASM OF UPPER LOBE OF LEFT LUNG (HCC): ICD-10-CM

## 2017-05-23 LAB
ALBUMIN SERPL BCP-MCNC: 4 G/DL (ref 3.2–4.6)
ALBUMIN/GLOB SERPL: 1 {RATIO} (ref 1.2–3.5)
ALP SERPL-CCNC: 88 U/L (ref 50–136)
ALT SERPL-CCNC: 29 U/L (ref 12–65)
ANION GAP BLD CALC-SCNC: 7 MMOL/L (ref 7–16)
AST SERPL W P-5'-P-CCNC: 18 U/L (ref 15–37)
BASOPHILS # BLD AUTO: 0.1 K/UL (ref 0–0.2)
BASOPHILS # BLD: 1 % (ref 0–2)
BILIRUB SERPL-MCNC: 0.8 MG/DL (ref 0.2–1.1)
BUN SERPL-MCNC: 10 MG/DL (ref 8–23)
CALCIUM SERPL-MCNC: 9.4 MG/DL (ref 8.3–10.4)
CHLORIDE SERPL-SCNC: 105 MMOL/L (ref 98–107)
CO2 SERPL-SCNC: 27 MMOL/L (ref 21–32)
CREAT SERPL-MCNC: 1.6 MG/DL (ref 0.8–1.5)
DIFFERENTIAL METHOD BLD: ABNORMAL
EOSINOPHIL # BLD: 0.1 K/UL (ref 0–0.8)
EOSINOPHIL NFR BLD: 1 % (ref 0.5–7.8)
ERYTHROCYTE [DISTWIDTH] IN BLOOD BY AUTOMATED COUNT: 13.1 % (ref 11.9–14.6)
GLOBULIN SER CALC-MCNC: 4 G/DL (ref 2.3–3.5)
GLUCOSE SERPL-MCNC: 131 MG/DL (ref 65–100)
HCT VFR BLD AUTO: 49.3 % (ref 41.1–50.3)
HGB BLD-MCNC: 16.7 G/DL (ref 13.6–17.2)
LYMPHOCYTES # BLD AUTO: 20 % (ref 13–44)
LYMPHOCYTES # BLD: 1.4 K/UL (ref 0.5–4.6)
MCH RBC QN AUTO: 33.3 PG (ref 26.1–32.9)
MCHC RBC AUTO-ENTMCNC: 33.9 G/DL (ref 31.4–35)
MCV RBC AUTO: 98.4 FL (ref 79.6–97.8)
MONOCYTES # BLD: 0.8 K/UL (ref 0.1–1.3)
MONOCYTES NFR BLD AUTO: 11 % (ref 4–12)
NEUTS SEG # BLD: 4.9 K/UL (ref 1.7–8.2)
NEUTS SEG NFR BLD AUTO: 68 % (ref 43–78)
NRBC # BLD: 0 K/UL (ref 0–0.2)
PLATELET # BLD AUTO: 210 K/UL (ref 150–450)
PMV BLD AUTO: 8.6 FL (ref 10.8–14.1)
POTASSIUM SERPL-SCNC: 4.6 MMOL/L (ref 3.5–5.1)
PROT SERPL-MCNC: 8 G/DL (ref 6.3–8.2)
RBC # BLD AUTO: 5.01 M/UL (ref 4.23–5.67)
SODIUM SERPL-SCNC: 139 MMOL/L (ref 136–145)
WBC # BLD AUTO: 7.1 K/UL (ref 4.3–11.1)

## 2017-05-23 PROCEDURE — 85025 COMPLETE CBC W/AUTO DIFF WBC: CPT | Performed by: INTERNAL MEDICINE

## 2017-05-23 PROCEDURE — 36415 COLL VENOUS BLD VENIPUNCTURE: CPT | Performed by: INTERNAL MEDICINE

## 2017-05-23 PROCEDURE — 80053 COMPREHEN METABOLIC PANEL: CPT | Performed by: INTERNAL MEDICINE

## 2017-05-23 NOTE — PROGRESS NOTES
Saw pt with Dr. Loreta Jiménez for f/u of EBUS results. Dr. Loreta Jimnéez explained the lymph node that was biopsied did show malignant cells. Recommendations were to f/u with Dr. Jmaal Gómez for treatment options. Dr. Loreta Jiménez explained, depending on the recommendations from Dr. Jamal Gómez and tumor board, he may  need chemotherapy, as well. Hopeful, chemotherapy will not be needed at this time. Will add pt to tumor board and referred back to rad onc. Pt seeing Dr. Jamal Gómez next week and Dr. Loreta Jiménez in 2 weeks. Pt's BP elevated this morning. States he took his BP med just a little while ago. Denies h/a or dizziness. Instructed to recheck BP sometime today and call if it continues to be high. Encouraged to call with concerns. Will continue to navigate.

## 2017-05-30 ENCOUNTER — HOSPITAL ENCOUNTER (OUTPATIENT)
Dept: RADIATION ONCOLOGY | Age: 77
Discharge: HOME | End: 2017-05-30
Payer: MEDICARE

## 2017-05-30 VITALS
TEMPERATURE: 97.8 F | HEART RATE: 74 BPM | OXYGEN SATURATION: 95 % | DIASTOLIC BLOOD PRESSURE: 76 MMHG | BODY MASS INDEX: 26.77 KG/M2 | SYSTOLIC BLOOD PRESSURE: 155 MMHG | WEIGHT: 197.4 LBS

## 2017-05-30 PROCEDURE — 99211 OFF/OP EST MAY X REQ PHY/QHP: CPT

## 2017-05-30 NOTE — PROGRESS NOTES
Patient: George Cruz MRN: [id number]  SSN: [id number]    YOB: 1940  Age: 65 y.o. Sex: male      CHIEF COMPLAINT: Lung cancer    DIAGNOSIS: Presumed T1a N0 lung cancer    SITE TREATED AND DOSE DELIVERED: Mr. Matteo Llamas received CyberKnife SBRT delivering 50 Gy in 5 fractions of 10 Gy each using 6 MV photons prescribed to the 78% isodose line. The plan required 124 beams and had an estimated treatment time of 53 minutes per fraction. TREATMENT DATES: 12/22/15 through 12/31/15. HISTORY OF PRESENT ILLNESS: George Cruz is a 67 y.o. male who I am seeing at the request of Dr. Jani Giron. He has a history of GOLD stage III COPD as well as a spiculated 1 cm LEFT upper lobe pulmonary nodule presents for followup. Chest CT on 07/17/15 showed a 9 mm HOWARD nodule. PET/CT on 08/18/15 demonstrated an 8mm spiculated left upper lobe lesion with slightly increased uptake in the PET images, 2.9 SUV Max. No other hypermetabolic lung lesions were seen. There was no significant uptake in the claudia or mediastinum. The scan also showed a 14 mm hypermetabolic mass inner adjacent to the inferior margin of the right parotid gland. The mass had a 5.9 SUV Max. He underwent navigational bronchoscopy under the direction of Dr. Jani Giron which did not reveal a diagnosis of cancer. His case was discussed at multidisciplinary thoracic tumor Board, and decision was made that the patient would be either a candidate for a wedge resection or CyberKnife therapy. He had complete pulmonary function which confirmed gold stage disease 3 status with an FEV1 of 1.59 L and 45% predicted, as well as a diffusion capacity of 42% predicted. This means that a lobectomy is not feasible for the patient and that a wedge resection or CyberKnife therapy is the next best option. I saw him for consultation on 10/08/15 to discuss these recommendations.  With regard to the parotid finding on PET/CT, saw ENT Dr. Coco Villareal, who felt that this was likely a benign tumor. He will continue to follow with Dr. Ivette Mclaughlin in 12/15. PET/CT on 11/10/15 demonstrated increased activity again seen in a left upper lobe nodule now with a maximum SUV of 2.8 g/mL. The size of the nodule appeared unchanged once again measuring 9 mm although there appears to be linear soft tissue density extending anteriorly and medially from the lesion. No new worrisome hypermetabolic abnormality was otherwise appreciated. His case was presented today at the [facility] [place] and recommendation was for navigational bronchoscopy with biopsy and placement of fiducial markers for probable CyberKnife SBRT. The biopsy was non-diagnostic. However, the revised group opinion was to move forward with SBRT based on the radiographic appearance and behavior of the lesion. Mr. Danielle Smith then received CyberKnife SBRT delivering 50 Gy in 5 fractions of 10 Gy each using 6 MV photons prescribed to the 78% isodose line. The plan required 124 beams and had an estimated treatment time of 53 minutes per fraction. Treatment was delivered from 12/22/15 through 12/31/15. He tolerated treatment without difficulty. His breathing remained at baseline. He denied cough. He felt essentially no changes during the treatment course. CT chest on 02/23/16 showed interval improvement. The spiculated lung nodule in the left upper lobe measured 8 x 8 mm (previous 11 x 11 mm.) No new lesions or mediastinal adenopathy was seen. Stable subpleural cystic changes were again noted in the right lower lobe. PET/CT on 05/26/16 showed the peripheral left upper lobe nodule to be unchanged at 7 mm in size. However, no appreciated FDG activity was seen. This was felt to represent treatment response. INTERVAL HISTORY: Mr. Danielle Smith returns for followup 16 months after completion of lung SBRT.      CT scan of the chest on 04/11/17 showed slightly less confluent density in the spiculated left upper lobe nodule suggesting continued clinical improvement. However, a new borderline enlarged lleft hilar lymph node was concerning for evolving metastatic disease. Bronchoscopy/EBUS with biopsy of left hilar lymph node (level 10) was positive for malignant cells. His case was discussed at multidisciplinary tumor board and recommendation was for consideration of definitive chemoradiotherapy versus radiation therapy alone. At this time, Mr. Sylwia Boothe is doing very well. His breathing is at baseline. If anything, he states that his breathing is improved. He has been on a walking regimen. He denies cough. His appetite is good. He has no systemic complaints. MEDICATIONS:     Current Outpatient Prescriptions:     rosuvastatin (CRESTOR) 10 mg tablet, Take 1 Tab by mouth nightly., Disp: 90 Tab, Rfl: 3    losartan (COZAAR) 100 mg tablet, TAKE 1 TABLET EVERY MORNING, Disp: 90 Tab, Rfl: 3    amLODIPine (NORVASC) 10 mg tablet, TAKE 1 TABLET EVERY DAY  FOR  HYPERTENSION, Disp: 90 Tab, Rfl: 3    ANORO ELLIPTA 62.5-25 mcg/actuation inhaler, TAKE 1 PUFF BY MOUTH EVERY DAY, Disp: 1 Inhaler, Rfl: 11    albuterol (VENTOLIN HFA) 90 mcg/actuation inhaler, Take 2 Puffs by inhalation four (4) times daily. , Disp: 3 Inhaler, Rfl: 3    aspirin 81 mg chewable tablet, Take 81 mg by mouth every morning., Disp: , Rfl:     ALLERGIES:   No Known Allergies    PHYSICAL EXAMINATION:   ECOG Performance status 1  VITAL SIGNS:   Visit Vitals    /76 (BP 1 Location: Left arm, BP Patient Position: Sitting)    Pulse 74    Temp 97.8 °F (36.6 °C) (Oral)    Wt 89.5 kg (197 lb 6.4 oz)    SpO2 95%    BMI 26.77 kg/m2        GENERAL: The patient is well-developed, ambulatory, alert and in no acute distress. HEENT: Head is normocephalic, atraumatic. Pupils are equal, round and reactive to light and accommodation. Extraocular movement intact. NECK: Neck is supple with no masses. CARDIOVASCULAR: Heart has regular rate and rhythm. There are no murmurs, rubs or gallops. Radial pulses are 2+ RESPIRATORY: Lungs are clear to auscultation and percussion. There is normal respiratory effort. LYMPHATIC: There is no cervical or supraclavicular lymphadenopathy bilaterally. MUSCULOSKELETAL: Extremities reveal no cyanosis, clubbing or edema.  is 5+/5. NEURO:  Cranial nerves II-XII grossly intact. Muscular strength and sensation are intact throughout all four extremities. LABORATORY:   Lab Results   Component Value Date/Time    Sodium 139 05/23/2017 09:08 AM    Potassium 4.6 05/23/2017 09:08 AM    Chloride 105 05/23/2017 09:08 AM    CO2 27 05/23/2017 09:08 AM    Anion gap 7 05/23/2017 09:08 AM    Glucose 131 05/23/2017 09:08 AM    BUN 10 05/23/2017 09:08 AM    Creatinine 1.60 05/23/2017 09:08 AM    GFR est AA 54 05/23/2017 09:08 AM    GFR est non-AA 45 05/23/2017 09:08 AM    Calcium 9.4 05/23/2017 09:08 AM    Magnesium 2.2 12/16/2016 10:06 AM    Albumin 4.0 05/23/2017 09:08 AM    Protein, total 8.0 05/23/2017 09:08 AM    Globulin 4.0 05/23/2017 09:08 AM    A-G Ratio 1.0 05/23/2017 09:08 AM    AST (SGOT) 18 05/23/2017 09:08 AM    ALT (SGPT) 29 05/23/2017 09:08 AM     Lab Results   Component Value Date/Time    WBC 7.1 05/23/2017 09:08 AM    HGB 16.7 05/23/2017 09:08 AM    HCT 49.3 05/23/2017 09:08 AM    PLATELET 961 39/88/9504 09:08 AM       PATHOLOGY:    05/17/17: DIAGNOSIS   10L Lymph Node, Fine Needle Aspiration:   MALIGNANT CELLS PRESENT. Cell block: Benign bronchial elements only. RADIOLOGY:    04/11/17: CT CHEST WITH CONTRAST DATED 4/11/2017.     History: Follow-up abnormal CT scan of the lungs.      Comparison: CT chest with contrast 2/23/2016      Technique: Multiple contiguous helical CT images reconstructed at 5 mm were  obtained from the neck base to the mid abdomen following the administration of  100 cc of Isovue 370 without acute complication.  All CT scans performed at this  facility use one or all of the following: Automated exposure control, adjustment  of the mA and/or kVp according to patient's size, iterative reconstruction.     Findings: The base of the neck is unremarkable in appearance. A new prominent left hilar  lymph node is seen on image 26 measuring 15 mm short axis which is borderline  enlarged. No evolving adenopathy is otherwise seen. Stable moderate  atherosclerotic changes, and diffuse ectasia of the descending thoracic aorta is  seen.     Evaluation with lung windows demonstrates continued improvement in the patient's  left upper lobe subpleural nodule now seen on image 13 measuring 10 mm x 7 mm. This now demonstrates less confluent density although is not felt to be  significantly changed in size. Adjacent metallic markers are seen which are not  felt to be significantly changed. Additional cystic lesion in the right lower  lobe now seen on image 43 is not felt to be significantly changed. No evolving  soft tissue component is seen to definitely suggest a worrisome process. No new  pulmonary nodules or masses are otherwise seen. No pleural effusion is seen. Lungs are expanded without evidence for pneumothorax. No evolving aggressive  osseous lesion is seen.      Limited evaluation of the upper abdomen demonstrates no acute abnormality. The  adrenal glands are unremarkable in appearance.     IMPRESSION  IMPRESSION:   1. Slightly less confluent density in the spiculated left upper lobe nodule  suggesting continued local improvement.      2. New borderline enlarged left hilar lymph node concerning for an evolving  metastatic lymph node given the size and the fact that this is a new finding. No  evidence for potential evolving metastatic disease is otherwise seen. The  additional cystic area in the right lower lobe is unchanged without evolving  soft tissue to suggest a worrisome process. 5/26/2016   PET/CT    Indication: Left upper lobe lung cancer status post radiation therapy, restaging  Radiopharmaceutical: 15.8 mCi F18-FDG, intravenously. Technique: Imaging was performed from the skull through the proximal thighs using routine PET/CT acquisition protocol. Imaging was performed approximately 60 minutes post injection. Oral contrast was administered. Radiation dose reduction techniques were used for this study:  Our CT scanners use one or all of the following: Automated exposure control, adjustment of the mA and/or kVp according to patient's size, iterative reconstruction. Serum glucose: 111 mg/dL prior to injection. Comparison studies: Chest CT 2/23/2016, PET/CT 11/10/2015  Findings:  Head and Neck: No lymphadenopathy or abnormal FDG uptake. Chest: No interval adenopathy. Heart size upper limits of normal. Extensive atherosclerotic calcification to include the coronary arteries. Fiducial markers left upper lobe. Peripheral left upper lobe nodule measures 7 mm, unchanged. No appreciable elevated FDG activity. Emphysema. No new pulmonary lesion on nonbreath-hold technique. Abdomen/Pelvis: No lymphadenopathy or abnormal FDG uptake. No bowel obstruction. No interval osseous uptake. Few bilateral nonobstructing 1 to 2 mm renal stones. 5/26/2016   IMPRESSION: Stable size left upper lobe 7 mm pulmonary nodule. No appreciable FDG activity on today's exam suggesting response to therapy. No new sites of disease. 02/23/16: CT of the chest with contrast.  CLINICAL INDICATION: Lung cancer, prior radiation therapy, follow-up evaluation  PROCEDURE: Serial thin section axial images obtained from the thoracic inlet  through the upper abdomen following the administration of 80 cc of Optiray-350  intravenous contrast. Radiation dose reduction techniques were used for this  study.  Our CT scanners use one or all of the following: Automated exposure  control, adjusted of the mA and/or kV according to patient size, iterative  reconstruction  COMPARISON: Chest CT dated 11/19/2015, PET CT dated 11/10/2013  FINDINGS:   No mediastinal or axillary adenopathy evident. No pleural or pericardial  effusion noted. Fiducial markers are now in place in the peripheral left upper  lobe just anterior to the spiculated lung nodule. Spiculated lung nodule is  again noted and slightly smaller in size now measuring 8 x 8 mm (previous 11 x  11 mm.) Emphysema is again noted. No new or suspicious pulmonary nodules  identified. There is no pneumothorax. There is no pleural effusion. Stable  subpleural cystic changes again evident in the right lower lobe. Limited evaluation of the upper abdomen shows adrenal glands to be normal.  No aggressive osseous lesions identified. IMPRESSION:  1. Slight decrease in size in the spiculated pulmonary nodule in the subpleural  left upper lobe after XRT. 2. Emphysema, stable. 3. Stable indeterminate cystic change in the subpleural right lower lobe. Ct Chest Wo Cont    9/10/2015 An CT CHEST wo contrast was performed according to the 's protocol for the patient's implant. These images are for constructing the implant only and have no diagnostic value. A Radiologist has not reviewed these images. Pet/ct Tumor Image Skull Thigh W (ini)    8/18/2015 PET/CT INDICATION: Left lung mass TECHNIQUE: After oral administration of gastroview and intravenous administration of 17.9 mCi of F18 FDG, noncontrast CT images were obtained for attenuation correction and for fusion with emission PET images. A series of overlapping emission PET images were then obtained beginning 60 minutes after injection of FDG. The area imaged spanned the region from the skull base to the mid thighs. COMPARISON: Chest CT dated 07/17/2015 FINDINGS: Head/Neck: There is a 14 mm hypermetabolic mass inner adjacent to the inferior margin of the right parotid gland. Mass measures 5.9 SUV Max. No other masses are seen in the neck. There is edema and surgical change in the left neck from recent carotid surgery.  Chest: The 8mm spiculated left upper lobe lesion shows slight increased uptake in the PET images, 2.9 SUV Max. No other hypermetabolic lung lesions are seen. There is no significant uptake in the claudia or mediastinum. Abdomen: There are no hypermetabolic liver or adrenal lesions. There is no significant adenopathy. Small nonobstructing stones are present in both kidneys. Pelvis: There is no significant abnormal uptake in the pelvis. There is no significant adenopathy. There are no bony lesions. 8/18/2015 IMPRESSION: 1. The left upper lobe lung mass does show slight increased uptake, concerning for an early lung cancer. 2. Hypermetabolic right parotid lesion. Malignancy is not excluded and biopsy/surgical resection is recommended. Pet/ct Tumor Image Skull Thigh (sub)    11/10/2015 PET/CT 11/10/2015 INDICATION: Left upper lobe nodule staging. TECHNIQUE: After oral administration of gastroview and intravenous administration of 16.37 mCi of F18 FDG, noncontrast CT images were obtained for attenuation correction and for fusion with emission PET images. A series of overlapping emission PET images were then obtained beginning 60 minutes after injection of FDG. The area imaged spanned the region from the skull base to the mid thighs. COMPARISON: PET scan 8/18/2015 FINDINGS: Neck/chest: A mild to moderately hypermetabolic lesion is once again seen in the right parotid gland on image 28 felt to correspond to the prior finding. Once again, a neoplasm cannot be excluded. Surgical clips are seen in the left neck soft tissues although adjacent soft tissue does appear improved best appreciated when comparing image 30 of the current study to image 30 of the prior exam. No evolving activity is seen to indicate a worrisome process. No evolving worrisome activity is otherwise seen. Increased activity is once again seen with the left upper lobe nodule now seen on image 74 demonstrating a maximum SUV of 2.8 g/mL.  The size of the nodule appears unchanged once again measuring 9 mm although there appears to be linear increasing soft tissue extending anterior medially from this nodule best appreciated on image 74. No new worrisome hypermetabolic abnormality is otherwise appreciated. Increased activity is seen in the dependent right lower lobe on image 121 although this appears to correspond to inflammatory appearing focus on the corresponding CT images. No evolving worrisome pulmonary nodules or masses are otherwise seen. Irregular densities are seen on the left wall of the trachea best appreciated on images 70 and 86 which appear to represent aspirated material. Abdomen/pelvis: Only physiologic activity is seen in the abdomen. No evolving adrenal lesion is seen. Stable faint nonobstructing stones are seen of the right kidney. Stable moderate atherosclerotic calcification is seen of the abdominal aorta. Only physiologic activity is seen in the pelvis. The urinary bladder appears thickwalled although this is likely due to poor distention when compared to the prior study. 11/10/2015 IMPRESSION: 1. Left upper lobe nodule once again seen. New linear soft tissue density is seen extending anteriorly and medially from this lesion. Some local progression cannot be excluded. Once again, this demonstrates increased activity concerning for a neoplasm. No evidence for evolving metastatic disease is seen to the chest, abdomen, or pelvis. 2. Grossly stable hypermetabolic right parotid lesion. Once again, a neoplasm cannot be excluded. 3. Irregular densities along the left wall of the trachea which appear to represent aspirated material.         IMPRESSION:  Lc Pickett is a 67 y.o. male with T1a N0 lung cancer, 2 months s/p SBRT    COUNSELING AND COORDINATION OF CARE: I have had a 40 minute follow up with Mr. Jone Arias of which greater than half was spent counseling him about continued management for his lung cancer.      CT scan of the chest on 04/11/17 showed slightly less confluent density in the spiculated left upper lobe nodule suggesting continued clinical improvement at the area treated with SBRT. However, a new borderline enlarged lleft hilar lymph node was concerning for evolving metastatic disease. Bronchoscopy/EBUS with biopsy of left hilar lymph node (level 10) was positive for malignant cells. His case was discussed at multidisciplinary tumor board and recommendation was for consideration of definitive chemoradiotherapy versus radiation therapy alone. I discussed this further with Dr. Gonzalez Carter and we agreed to proceed with chemoradiotherapy if the patient is willing. Mr. Bill Cabrera has requested that radiation therapy be delivered at [facility] because this is significantly closer to home for him. He will be seen there for CT simulation on 06/08/17 with radiation therapy to begin 1-2 weeks after. He will receive 60 Gy in 30 fractions to the involved area at the left hilum. I appreciate the opportunity to participate in Mr. Glez's care/      CC: Christiano Galeana M.D. .    Portions of this note were copied from prior encounters and reviewed for accuracy, currency, and represent documentation and tasks completed during this encounter. I verify and attest these portions to be unchanged from prior visits.     Ananda Echavarria MD  05/30/17          Ananda Echavarria MD   May 30, 2017

## 2017-05-30 NOTE — NURSE NAVIGATOR
Pt here for f/u for HOWARD lung cancer. Ct chest 4-11-17- New hilar lymph node. Path 5-17-17. Plan is RT alone or RT with chemo. F/u Dr. Oracio Leonardo 6-6-17.     David Kruse RN

## 2017-06-06 ENCOUNTER — HOSPITAL ENCOUNTER (OUTPATIENT)
Dept: LAB | Age: 77
Discharge: HOME | End: 2017-06-06
Payer: MEDICARE

## 2017-06-06 ENCOUNTER — HOSPITAL ENCOUNTER (OUTPATIENT)
Dept: ULTRASOUND IMAGING | Age: 77
Discharge: HOME | End: 2017-06-06
Attending: INTERNAL MEDICINE
Payer: MEDICARE

## 2017-06-06 DIAGNOSIS — C34.12 MALIGNANT NEOPLASM OF UPPER LOBE OF LEFT LUNG (HCC): ICD-10-CM

## 2017-06-06 LAB
ALBUMIN SERPL BCP-MCNC: 3.9 G/DL (ref 3.2–4.6)
ALBUMIN/GLOB SERPL: 1 {RATIO} (ref 1.2–3.5)
ALP SERPL-CCNC: 84 U/L (ref 50–136)
ALT SERPL-CCNC: 34 U/L (ref 12–65)
ANION GAP BLD CALC-SCNC: 10 MMOL/L (ref 7–16)
AST SERPL W P-5'-P-CCNC: 19 U/L (ref 15–37)
BASOPHILS # BLD AUTO: 0.1 K/UL (ref 0–0.2)
BASOPHILS # BLD: 1 % (ref 0–2)
BILIRUB SERPL-MCNC: 0.8 MG/DL (ref 0.2–1.1)
BUN SERPL-MCNC: 12 MG/DL (ref 8–23)
CALCIUM SERPL-MCNC: 9.3 MG/DL (ref 8.3–10.4)
CHLORIDE SERPL-SCNC: 104 MMOL/L (ref 98–107)
CO2 SERPL-SCNC: 24 MMOL/L (ref 21–32)
CREAT SERPL-MCNC: 1.71 MG/DL (ref 0.8–1.5)
DIFFERENTIAL METHOD BLD: ABNORMAL
EOSINOPHIL # BLD: 0 K/UL (ref 0–0.8)
EOSINOPHIL NFR BLD: 0 % (ref 0.5–7.8)
ERYTHROCYTE [DISTWIDTH] IN BLOOD BY AUTOMATED COUNT: 13.2 % (ref 11.9–14.6)
GLOBULIN SER CALC-MCNC: 3.9 G/DL (ref 2.3–3.5)
GLUCOSE SERPL-MCNC: 142 MG/DL (ref 65–100)
HCT VFR BLD AUTO: 48.3 % (ref 41.1–50.3)
HGB BLD-MCNC: 16.8 G/DL (ref 13.6–17.2)
LYMPHOCYTES # BLD AUTO: 13 % (ref 13–44)
LYMPHOCYTES # BLD: 1.2 K/UL (ref 0.5–4.6)
MCH RBC QN AUTO: 33.7 PG (ref 26.1–32.9)
MCHC RBC AUTO-ENTMCNC: 34.8 G/DL (ref 31.4–35)
MCV RBC AUTO: 96.8 FL (ref 79.6–97.8)
MONOCYTES # BLD: 0.9 K/UL (ref 0.1–1.3)
MONOCYTES NFR BLD AUTO: 9 % (ref 4–12)
NEUTS SEG # BLD: 7.2 K/UL (ref 1.7–8.2)
NEUTS SEG NFR BLD AUTO: 77 % (ref 43–78)
NRBC # BLD: 0 K/UL (ref 0–0.2)
PLATELET # BLD AUTO: 204 K/UL (ref 150–450)
PMV BLD AUTO: 9 FL (ref 10.8–14.1)
POTASSIUM SERPL-SCNC: 4.7 MMOL/L (ref 3.5–5.1)
PROT SERPL-MCNC: 7.8 G/DL (ref 6.3–8.2)
RBC # BLD AUTO: 4.99 M/UL (ref 4.23–5.67)
SODIUM SERPL-SCNC: 138 MMOL/L (ref 136–145)
WBC # BLD AUTO: 9.4 K/UL (ref 4.3–11.1)

## 2017-06-06 PROCEDURE — 85025 COMPLETE CBC W/AUTO DIFF WBC: CPT | Performed by: INTERNAL MEDICINE

## 2017-06-06 PROCEDURE — 36415 COLL VENOUS BLD VENIPUNCTURE: CPT | Performed by: INTERNAL MEDICINE

## 2017-06-06 PROCEDURE — 80053 COMPREHEN METABOLIC PANEL: CPT | Performed by: INTERNAL MEDICINE

## 2017-06-06 PROCEDURE — 93971 EXTREMITY STUDY: CPT

## 2017-06-12 PROBLEM — C34.10 MALIGNANT NEOPLASM OF UPPER LOBE OF LUNG (HCC): Status: ACTIVE | Noted: 2017-05-17

## 2017-06-19 ENCOUNTER — PATIENT OUTREACH (OUTPATIENT)
Dept: CASE MANAGEMENT | Age: 77
End: 2017-06-19

## 2017-06-19 NOTE — PROGRESS NOTES
Spoke with pt concerning port placement and appts. Pt was supposed to have port placed prior to starting treatment this week but was not ordered at last visit. Pt called today 6/19 to inquire when this would be placed. Discussed with Dr. Fatimah Kothari, and because pt will have weekly chemo for approx 6 weeks, it was decided to place PICC instead. Notified pt of change in plans and scheduled to have this placed tomorrow morning 6/20 at 11:30 am.  Notified pt of time and location. Verbalized understanding. He will then have chemo ed, FC and OV with NP. Planned to start treatment with concurrent chemo/rad on Wednesday 6/21. Confirmed start date with Nessa Ambrose at [facility]. Pt aware of these times, as well. Question of if pt needs 2d echo. Will discuss this at visit with NP tomorrow. Encouraged to call with concerns. Will continue to navigate.

## 2017-06-20 ENCOUNTER — DOCUMENTATION ONLY (OUTPATIENT)
Dept: HEMATOLOGY | Age: 77
End: 2017-06-20

## 2017-06-20 ENCOUNTER — HOSPITAL ENCOUNTER (OUTPATIENT)
Dept: MEDSURG UNIT | Age: 77
Discharge: HOME | End: 2017-06-20
Payer: MEDICARE

## 2017-06-20 ENCOUNTER — PATIENT OUTREACH (OUTPATIENT)
Dept: CASE MANAGEMENT | Age: 77
End: 2017-06-20

## 2017-06-20 ENCOUNTER — HOSPITAL ENCOUNTER (OUTPATIENT)
Dept: LAB | Age: 77
Discharge: HOME | End: 2017-06-20
Payer: MEDICARE

## 2017-06-20 DIAGNOSIS — C34.10 MALIGNANT NEOPLASM OF UPPER LOBE OF LUNG, UNSPECIFIED LATERALITY (HCC): ICD-10-CM

## 2017-06-20 LAB
ALBUMIN SERPL BCP-MCNC: 3.9 G/DL (ref 3.2–4.6)
ALBUMIN/GLOB SERPL: 1.1 {RATIO} (ref 1.2–3.5)
ALP SERPL-CCNC: 75 U/L (ref 50–136)
ALT SERPL-CCNC: 31 U/L (ref 12–65)
ANION GAP BLD CALC-SCNC: 8 MMOL/L (ref 7–16)
AST SERPL W P-5'-P-CCNC: 24 U/L (ref 15–37)
BASOPHILS # BLD AUTO: 0.1 K/UL (ref 0–0.2)
BASOPHILS # BLD: 1 % (ref 0–2)
BILIRUB SERPL-MCNC: 0.7 MG/DL (ref 0.2–1.1)
BUN SERPL-MCNC: 19 MG/DL (ref 8–23)
CALCIUM SERPL-MCNC: 9.4 MG/DL (ref 8.3–10.4)
CHLORIDE SERPL-SCNC: 106 MMOL/L (ref 98–107)
CO2 SERPL-SCNC: 25 MMOL/L (ref 21–32)
CREAT SERPL-MCNC: 1.54 MG/DL (ref 0.8–1.5)
DIFFERENTIAL METHOD BLD: ABNORMAL
EOSINOPHIL # BLD: 0 K/UL (ref 0–0.8)
EOSINOPHIL NFR BLD: 0 % (ref 0.5–7.8)
ERYTHROCYTE [DISTWIDTH] IN BLOOD BY AUTOMATED COUNT: 13.2 % (ref 11.9–14.6)
GLOBULIN SER CALC-MCNC: 3.7 G/DL (ref 2.3–3.5)
GLUCOSE SERPL-MCNC: 109 MG/DL (ref 65–100)
HCT VFR BLD AUTO: 45.1 % (ref 41.1–50.3)
HGB BLD-MCNC: 15.6 G/DL (ref 13.6–17.2)
LYMPHOCYTES # BLD AUTO: 16 % (ref 13–44)
LYMPHOCYTES # BLD: 1.2 K/UL (ref 0.5–4.6)
MCH RBC QN AUTO: 33.7 PG (ref 26.1–32.9)
MCHC RBC AUTO-ENTMCNC: 34.6 G/DL (ref 31.4–35)
MCV RBC AUTO: 97.4 FL (ref 79.6–97.8)
MONOCYTES # BLD: 0.6 K/UL (ref 0.1–1.3)
MONOCYTES NFR BLD AUTO: 9 % (ref 4–12)
NEUTS SEG # BLD: 5.4 K/UL (ref 1.7–8.2)
NEUTS SEG NFR BLD AUTO: 74 % (ref 43–78)
NRBC # BLD: 0 K/UL (ref 0–0.2)
PLATELET # BLD AUTO: 192 K/UL (ref 150–450)
PMV BLD AUTO: 9 FL (ref 10.8–14.1)
POTASSIUM SERPL-SCNC: 4.3 MMOL/L (ref 3.5–5.1)
PROT SERPL-MCNC: 7.6 G/DL (ref 6.3–8.2)
RBC # BLD AUTO: 4.63 M/UL (ref 4.23–5.67)
SODIUM SERPL-SCNC: 139 MMOL/L (ref 136–145)
WBC # BLD AUTO: 7.3 K/UL (ref 4.3–11.1)

## 2017-06-20 PROCEDURE — C1751 CATH, INF, PER/CENT/MIDLINE: HCPCS

## 2017-06-20 PROCEDURE — 85025 COMPLETE CBC W/AUTO DIFF WBC: CPT | Performed by: NURSE PRACTITIONER

## 2017-06-20 PROCEDURE — 77030018786 HC NDL GD F/USND BARD -B

## 2017-06-20 PROCEDURE — 76937 US GUIDE VASCULAR ACCESS: CPT

## 2017-06-20 PROCEDURE — 80053 COMPREHEN METABOLIC PANEL: CPT | Performed by: NURSE PRACTITIONER

## 2017-06-20 PROCEDURE — 77030018719 HC DRSG PTCH ANTIMIC J&J -A

## 2017-06-20 PROCEDURE — 36569 INSJ PICC 5 YR+ W/O IMAGING: CPT | Performed by: INTERNAL MEDICINE

## 2017-06-20 NOTE — PROGRESS NOTES
Saw pt with Zurdo Gomez NP for C1 carbo/taxol with concurrent radiation. He completed chemo ed and FC today and had PICC line placed yesterday. Pt will receive radiation at [facility] on [address].  Educated pt on how to manage symptoms and sent nausea medications to pharmacy. Will schedule for weekly PICC dressing changes with each treatment. Pt was scheduled for 2d echo at last visit but was not aware of appt. R/s'd this and will give pt his appts. Encouraged pt to call with concerns. Will continue to navigate.

## 2017-06-20 NOTE — PROGRESS NOTES
PICC Placement Note    PRE-PROCEDURE VERIFICATION  Correct Procedure: yes. Time out completed with assistant Caterina Castle RN;  and all persons present in agreement with time out. Correct Site:  yes  Temperature:  , Temperature Source:    No results for input(s): BUN, CREA, PLT, INR, WBC, PLTEXT in the last 72 hours. No lab exists for component: APTHR, INREXT  Allergies: Review of patient's allergies indicates no known allergies. Education materials for [facility] given to patient or family. PROCEDURE DETAIL  A double lumen PICC line was started for vascular access. The following documentation is in addition to the PICC properties in the lines/airways flowsheet :  Lot #: [id number]  xylocaine used: yes  Mid-Arm Circumference: 29 (cm)  Internal Catheter Length: 41 (cm)  Internal Catheter Total Length: 41 (cm)  Vein Selection for PICC:right basilic  Central Line Bundle followed yes  Complication Related to Insertion: none  Both the insertion guidewire and sherlock guidewire were removed intact all ports have positive blood return and were flush well with normal saline. The placement was verified by sapiens ecg. The ECG results state the tip overlies the lower superior vena cava.        Line is okay to use: yes    Ramesh Schaeffer RN

## 2017-06-21 ENCOUNTER — HOSPITAL ENCOUNTER (OUTPATIENT)
Dept: INFUSION THERAPY | Age: 77
Discharge: HOME | End: 2017-06-21
Payer: MEDICARE

## 2017-06-21 VITALS
TEMPERATURE: 97.9 F | OXYGEN SATURATION: 92 % | BODY MASS INDEX: 26.61 KG/M2 | WEIGHT: 196.2 LBS | SYSTOLIC BLOOD PRESSURE: 121 MMHG | HEART RATE: 70 BPM | DIASTOLIC BLOOD PRESSURE: 60 MMHG | RESPIRATION RATE: 20 BRPM

## 2017-06-21 DIAGNOSIS — C34.10 MALIGNANT NEOPLASM OF UPPER LOBE OF LUNG, UNSPECIFIED LATERALITY (HCC): ICD-10-CM

## 2017-06-21 PROCEDURE — 96375 TX/PRO/DX INJ NEW DRUG ADDON: CPT

## 2017-06-21 PROCEDURE — 74011000250 HC RX REV CODE- 250: Performed by: INTERNAL MEDICINE

## 2017-06-21 PROCEDURE — 74011250636 HC RX REV CODE- 250/636: Performed by: INTERNAL MEDICINE

## 2017-06-21 PROCEDURE — 96411 CHEMO IV PUSH ADDL DRUG: CPT

## 2017-06-21 PROCEDURE — 96413 CHEMO IV INFUSION 1 HR: CPT

## 2017-06-21 PROCEDURE — 74011000258 HC RX REV CODE- 258: Performed by: INTERNAL MEDICINE

## 2017-06-21 RX ORDER — HEPARIN 100 UNIT/ML
600 SYRINGE INTRAVENOUS AS NEEDED
Status: DISPENSED | OUTPATIENT
Start: 2017-06-21 | End: 2017-06-21

## 2017-06-21 RX ORDER — SODIUM CHLORIDE 0.9 % (FLUSH) 0.9 %
10 SYRINGE (ML) INJECTION AS NEEDED
Status: ACTIVE | OUTPATIENT
Start: 2017-06-21 | End: 2017-06-21

## 2017-06-21 RX ORDER — HEPARIN 100 UNIT/ML
300-500 SYRINGE INTRAVENOUS AS NEEDED
Status: DISCONTINUED | OUTPATIENT
Start: 2017-06-21 | End: 2017-06-21

## 2017-06-21 RX ORDER — ONDANSETRON 2 MG/ML
8 INJECTION INTRAMUSCULAR; INTRAVENOUS ONCE
Status: COMPLETED | OUTPATIENT
Start: 2017-06-21 | End: 2017-06-21

## 2017-06-21 RX ORDER — DIPHENHYDRAMINE HYDROCHLORIDE 50 MG/ML
50 INJECTION, SOLUTION INTRAMUSCULAR; INTRAVENOUS ONCE
Status: COMPLETED | OUTPATIENT
Start: 2017-06-21 | End: 2017-06-21

## 2017-06-21 RX ORDER — DIPHENHYDRAMINE HYDROCHLORIDE 50 MG/ML
50 INJECTION, SOLUTION INTRAMUSCULAR; INTRAVENOUS AS NEEDED
Status: DISPENSED | OUTPATIENT
Start: 2017-06-21 | End: 2017-06-21

## 2017-06-21 RX ORDER — LORAZEPAM 2 MG/ML
0.5 INJECTION INTRAMUSCULAR
Status: ACTIVE | OUTPATIENT
Start: 2017-06-21 | End: 2017-06-21

## 2017-06-21 RX ORDER — FAMOTIDINE 10 MG/ML
20 INJECTION INTRAVENOUS ONCE
Status: COMPLETED | OUTPATIENT
Start: 2017-06-21 | End: 2017-06-21

## 2017-06-21 RX ORDER — HYDROCORTISONE SODIUM SUCCINATE 100 MG/2ML
100 INJECTION, POWDER, FOR SOLUTION INTRAMUSCULAR; INTRAVENOUS AS NEEDED
Status: DISPENSED | OUTPATIENT
Start: 2017-06-21 | End: 2017-06-21

## 2017-06-21 RX ORDER — DEXAMETHASONE SODIUM PHOSPHATE 100 MG/10ML
10 INJECTION INTRAMUSCULAR; INTRAVENOUS ONCE
Status: COMPLETED | OUTPATIENT
Start: 2017-06-21 | End: 2017-06-21

## 2017-06-21 RX ADMIN — SODIUM CHLORIDE 500 ML: 900 INJECTION, SOLUTION INTRAVENOUS at 10:00

## 2017-06-21 RX ADMIN — ONDANSETRON 8 MG: 2 INJECTION INTRAMUSCULAR; INTRAVENOUS at 09:57

## 2017-06-21 RX ADMIN — Medication 10 ML: at 09:05

## 2017-06-21 RX ADMIN — SODIUM CHLORIDE, PRESERVATIVE FREE 600 UNITS: 5 INJECTION INTRAVENOUS at 12:12

## 2017-06-21 RX ADMIN — Medication 10 ML: at 12:10

## 2017-06-21 RX ADMIN — FAMOTIDINE 20 MG: 10 INJECTION, SOLUTION INTRAVENOUS at 09:48

## 2017-06-21 RX ADMIN — PACLITAXEL 95 MG: 6 INJECTION, SOLUTION, CONCENTRATE INTRAVENOUS at 10:30

## 2017-06-21 RX ADMIN — CARBOPLATIN 150 MG: 10 INJECTION, SOLUTION INTRAVENOUS at 11:45

## 2017-06-21 RX ADMIN — DEXAMETHASONE SODIUM PHOSPHATE 10 MG: 10 INJECTION INTRAMUSCULAR; INTRAVENOUS at 09:54

## 2017-06-21 RX ADMIN — DIPHENHYDRAMINE HYDROCHLORIDE 50 MG: 50 INJECTION, SOLUTION INTRAMUSCULAR; INTRAVENOUS at 09:49

## 2017-06-21 NOTE — PROGRESS NOTES
Arrived for D1C1 chemo  Questions answered,reviewed symptom management and \" when to call MD \"  Verbalized understanding  Tolerated carbo/taxol with no complications  Next appt 6/28

## 2017-06-27 ENCOUNTER — HOSPITAL ENCOUNTER (OUTPATIENT)
Dept: LAB | Age: 77
Discharge: HOME | End: 2017-06-27
Payer: MEDICARE

## 2017-06-27 ENCOUNTER — PATIENT OUTREACH (OUTPATIENT)
Dept: CASE MANAGEMENT | Age: 77
End: 2017-06-27

## 2017-06-27 ENCOUNTER — HOSPITAL ENCOUNTER (OUTPATIENT)
Dept: INFUSION THERAPY | Age: 77
Discharge: HOME | End: 2017-06-27
Payer: MEDICARE

## 2017-06-27 DIAGNOSIS — C34.10 MALIGNANT NEOPLASM OF UPPER LOBE OF LUNG, UNSPECIFIED LATERALITY (HCC): ICD-10-CM

## 2017-06-27 LAB
ALBUMIN SERPL BCP-MCNC: 3.5 G/DL (ref 3.2–4.6)
ALBUMIN/GLOB SERPL: 1 {RATIO} (ref 1.2–3.5)
ALP SERPL-CCNC: 76 U/L (ref 50–136)
ALT SERPL-CCNC: 34 U/L (ref 12–65)
ANION GAP BLD CALC-SCNC: 9 MMOL/L (ref 7–16)
AST SERPL W P-5'-P-CCNC: 19 U/L (ref 15–37)
BASOPHILS # BLD AUTO: 0 K/UL (ref 0–0.2)
BASOPHILS # BLD: 1 % (ref 0–2)
BILIRUB SERPL-MCNC: 1 MG/DL (ref 0.2–1.1)
BUN SERPL-MCNC: 15 MG/DL (ref 8–23)
CALCIUM SERPL-MCNC: 8.9 MG/DL (ref 8.3–10.4)
CHLORIDE SERPL-SCNC: 105 MMOL/L (ref 98–107)
CO2 SERPL-SCNC: 24 MMOL/L (ref 21–32)
CREAT SERPL-MCNC: 1.36 MG/DL (ref 0.8–1.5)
DIFFERENTIAL METHOD BLD: ABNORMAL
EOSINOPHIL # BLD: 0.2 K/UL (ref 0–0.8)
EOSINOPHIL NFR BLD: 3 % (ref 0.5–7.8)
ERYTHROCYTE [DISTWIDTH] IN BLOOD BY AUTOMATED COUNT: 12.8 % (ref 11.9–14.6)
GLOBULIN SER CALC-MCNC: 3.6 G/DL (ref 2.3–3.5)
GLUCOSE SERPL-MCNC: 124 MG/DL (ref 65–100)
HCT VFR BLD AUTO: 44.1 % (ref 41.1–50.3)
HGB BLD-MCNC: 15.2 G/DL (ref 13.6–17.2)
LYMPHOCYTES # BLD AUTO: 19 % (ref 13–44)
LYMPHOCYTES # BLD: 1.1 K/UL (ref 0.5–4.6)
MAGNESIUM SERPL-MCNC: 2.2 MG/DL (ref 1.8–2.4)
MCH RBC QN AUTO: 33.7 PG (ref 26.1–32.9)
MCHC RBC AUTO-ENTMCNC: 34.5 G/DL (ref 31.4–35)
MCV RBC AUTO: 97.8 FL (ref 79.6–97.8)
MONOCYTES # BLD: 0.2 K/UL (ref 0.1–1.3)
MONOCYTES NFR BLD AUTO: 3 % (ref 4–12)
NEUTS SEG # BLD: 4.5 K/UL (ref 1.7–8.2)
NEUTS SEG NFR BLD AUTO: 75 % (ref 43–78)
NRBC # BLD: 0 K/UL (ref 0–0.2)
PLATELET # BLD AUTO: 154 K/UL (ref 150–450)
PMV BLD AUTO: 9.4 FL (ref 10.8–14.1)
POTASSIUM SERPL-SCNC: 4.1 MMOL/L (ref 3.5–5.1)
PROT SERPL-MCNC: 7.1 G/DL (ref 6.3–8.2)
RBC # BLD AUTO: 4.51 M/UL (ref 4.23–5.67)
SODIUM SERPL-SCNC: 138 MMOL/L (ref 136–145)
WBC # BLD AUTO: 6.1 K/UL (ref 4.3–11.1)

## 2017-06-27 PROCEDURE — 74011000258 HC RX REV CODE- 258: Performed by: INTERNAL MEDICINE

## 2017-06-27 PROCEDURE — 96417 CHEMO IV INFUS EACH ADDL SEQ: CPT

## 2017-06-27 PROCEDURE — 85025 COMPLETE CBC W/AUTO DIFF WBC: CPT | Performed by: NURSE PRACTITIONER

## 2017-06-27 PROCEDURE — 74011000250 HC RX REV CODE- 250: Performed by: INTERNAL MEDICINE

## 2017-06-27 PROCEDURE — 80053 COMPREHEN METABOLIC PANEL: CPT | Performed by: NURSE PRACTITIONER

## 2017-06-27 PROCEDURE — 74011250636 HC RX REV CODE- 250/636: Performed by: INTERNAL MEDICINE

## 2017-06-27 PROCEDURE — 96375 TX/PRO/DX INJ NEW DRUG ADDON: CPT

## 2017-06-27 PROCEDURE — 96413 CHEMO IV INFUSION 1 HR: CPT

## 2017-06-27 PROCEDURE — 83735 ASSAY OF MAGNESIUM: CPT | Performed by: NURSE PRACTITIONER

## 2017-06-27 RX ORDER — ONDANSETRON 2 MG/ML
8 INJECTION INTRAMUSCULAR; INTRAVENOUS ONCE
Status: COMPLETED | OUTPATIENT
Start: 2017-06-27 | End: 2017-06-27

## 2017-06-27 RX ORDER — DIPHENHYDRAMINE HYDROCHLORIDE 50 MG/ML
50 INJECTION, SOLUTION INTRAMUSCULAR; INTRAVENOUS ONCE
Status: COMPLETED | OUTPATIENT
Start: 2017-06-27 | End: 2017-06-27

## 2017-06-27 RX ORDER — HEPARIN 100 UNIT/ML
300-500 SYRINGE INTRAVENOUS AS NEEDED
Status: DISPENSED | OUTPATIENT
Start: 2017-06-27 | End: 2017-06-27

## 2017-06-27 RX ORDER — FAMOTIDINE 10 MG/ML
20 INJECTION INTRAVENOUS ONCE
Status: COMPLETED | OUTPATIENT
Start: 2017-06-27 | End: 2017-06-27

## 2017-06-27 RX ORDER — SODIUM CHLORIDE 0.9 % (FLUSH) 0.9 %
10 SYRINGE (ML) INJECTION AS NEEDED
Status: ACTIVE | OUTPATIENT
Start: 2017-06-27 | End: 2017-06-27

## 2017-06-27 RX ORDER — DEXAMETHASONE SODIUM PHOSPHATE 100 MG/10ML
10 INJECTION INTRAMUSCULAR; INTRAVENOUS ONCE
Status: COMPLETED | OUTPATIENT
Start: 2017-06-27 | End: 2017-06-27

## 2017-06-27 RX ADMIN — PACLITAXEL 95 MG: 6 INJECTION, SOLUTION, CONCENTRATE INTRAVENOUS at 10:43

## 2017-06-27 RX ADMIN — FAMOTIDINE 20 MG: 10 INJECTION, SOLUTION INTRAVENOUS at 09:42

## 2017-06-27 RX ADMIN — ONDANSETRON 8 MG: 2 INJECTION INTRAMUSCULAR; INTRAVENOUS at 09:39

## 2017-06-27 RX ADMIN — DEXAMETHASONE SODIUM PHOSPHATE 10 MG: 10 INJECTION INTRAMUSCULAR; INTRAVENOUS at 09:36

## 2017-06-27 RX ADMIN — SODIUM CHLORIDE 500 ML: 900 INJECTION, SOLUTION INTRAVENOUS at 10:04

## 2017-06-27 RX ADMIN — Medication 20 ML: at 09:20

## 2017-06-27 RX ADMIN — SODIUM CHLORIDE, PRESERVATIVE FREE 600 UNITS: 5 INJECTION INTRAVENOUS at 12:10

## 2017-06-27 RX ADMIN — CARBOPLATIN 161 MG: 10 INJECTION, SOLUTION INTRAVENOUS at 11:48

## 2017-06-27 RX ADMIN — DIPHENHYDRAMINE HYDROCHLORIDE 50 MG: 50 INJECTION, SOLUTION INTRAMUSCULAR; INTRAVENOUS at 10:01

## 2017-06-27 NOTE — PROGRESS NOTES
Arrived to the [facility] of [facility]. infusion completed. Patient tolerated well. Any issues or concerns during appointment: none. Patient aware of next infusion appointment on 7/5/17 (date) at 56 (time). Discharged ambulatory.

## 2017-06-29 ENCOUNTER — HOSPITAL ENCOUNTER (OUTPATIENT)
Dept: NON INVASIVE DIAGNOSTICS | Age: 77
Discharge: HOME | End: 2017-06-29
Attending: INTERNAL MEDICINE
Payer: MEDICARE

## 2017-06-29 DIAGNOSIS — C34.12 MALIGNANT NEOPLASM OF UPPER LOBE OF LEFT LUNG (HCC): ICD-10-CM

## 2017-06-29 PROCEDURE — 93306 TTE W/DOPPLER COMPLETE: CPT

## 2017-07-05 ENCOUNTER — HOSPITAL ENCOUNTER (OUTPATIENT)
Dept: INFUSION THERAPY | Age: 77
Discharge: HOME | End: 2017-07-05
Payer: MEDICARE

## 2017-07-05 ENCOUNTER — HOSPITAL ENCOUNTER (OUTPATIENT)
Dept: LAB | Age: 77
Discharge: HOME | End: 2017-07-05
Payer: MEDICARE

## 2017-07-05 VITALS — DIASTOLIC BLOOD PRESSURE: 64 MMHG | SYSTOLIC BLOOD PRESSURE: 116 MMHG | HEART RATE: 70 BPM

## 2017-07-05 DIAGNOSIS — C34.10 MALIGNANT NEOPLASM OF UPPER LOBE OF LUNG, UNSPECIFIED LATERALITY (HCC): ICD-10-CM

## 2017-07-05 LAB
ALBUMIN SERPL BCP-MCNC: 3.5 G/DL (ref 3.2–4.6)
ALBUMIN/GLOB SERPL: 1 {RATIO} (ref 1.2–3.5)
ALP SERPL-CCNC: 95 U/L (ref 50–136)
ALT SERPL-CCNC: 48 U/L (ref 12–65)
ANION GAP BLD CALC-SCNC: 6 MMOL/L (ref 7–16)
AST SERPL W P-5'-P-CCNC: 31 U/L (ref 15–37)
BASOPHILS # BLD AUTO: 0 K/UL (ref 0–0.2)
BASOPHILS # BLD: 1 % (ref 0–2)
BILIRUB SERPL-MCNC: 0.6 MG/DL (ref 0.2–1.1)
BUN SERPL-MCNC: 19 MG/DL (ref 8–23)
CALCIUM SERPL-MCNC: 8.8 MG/DL (ref 8.3–10.4)
CHLORIDE SERPL-SCNC: 105 MMOL/L (ref 98–107)
CO2 SERPL-SCNC: 24 MMOL/L (ref 21–32)
CREAT SERPL-MCNC: 1.68 MG/DL (ref 0.8–1.5)
DIFFERENTIAL METHOD BLD: ABNORMAL
EOSINOPHIL # BLD: 0 K/UL (ref 0–0.8)
EOSINOPHIL NFR BLD: 1 % (ref 0.5–7.8)
ERYTHROCYTE [DISTWIDTH] IN BLOOD BY AUTOMATED COUNT: 12.7 % (ref 11.9–14.6)
GLOBULIN SER CALC-MCNC: 3.5 G/DL (ref 2.3–3.5)
GLUCOSE SERPL-MCNC: 126 MG/DL (ref 65–100)
HCT VFR BLD AUTO: 40.5 % (ref 41.1–50.3)
HGB BLD-MCNC: 14.2 G/DL (ref 13.6–17.2)
LYMPHOCYTES # BLD AUTO: 28 % (ref 13–44)
LYMPHOCYTES # BLD: 0.9 K/UL (ref 0.5–4.6)
MAGNESIUM SERPL-MCNC: 2 MG/DL (ref 1.8–2.4)
MCH RBC QN AUTO: 34.1 PG (ref 26.1–32.9)
MCHC RBC AUTO-ENTMCNC: 35.1 G/DL (ref 31.4–35)
MCV RBC AUTO: 97.1 FL (ref 79.6–97.8)
MONOCYTES # BLD: 0.3 K/UL (ref 0.1–1.3)
MONOCYTES NFR BLD AUTO: 11 % (ref 4–12)
NEUTS SEG # BLD: 1.9 K/UL (ref 1.7–8.2)
NEUTS SEG NFR BLD AUTO: 60 % (ref 43–78)
NRBC # BLD: 0 K/UL (ref 0–0.2)
PLATELET # BLD AUTO: 181 K/UL (ref 150–450)
PMV BLD AUTO: 8.9 FL (ref 10.8–14.1)
POTASSIUM SERPL-SCNC: 4.3 MMOL/L (ref 3.5–5.1)
PROT SERPL-MCNC: 7 G/DL (ref 6.3–8.2)
RBC # BLD AUTO: 4.17 M/UL (ref 4.23–5.67)
SODIUM SERPL-SCNC: 135 MMOL/L (ref 136–145)
WBC # BLD AUTO: 3.1 K/UL (ref 4.3–11.1)

## 2017-07-05 PROCEDURE — 80053 COMPREHEN METABOLIC PANEL: CPT | Performed by: INTERNAL MEDICINE

## 2017-07-05 PROCEDURE — 96375 TX/PRO/DX INJ NEW DRUG ADDON: CPT

## 2017-07-05 PROCEDURE — 96413 CHEMO IV INFUSION 1 HR: CPT

## 2017-07-05 PROCEDURE — 83735 ASSAY OF MAGNESIUM: CPT | Performed by: INTERNAL MEDICINE

## 2017-07-05 PROCEDURE — 74011000258 HC RX REV CODE- 258: Performed by: NURSE PRACTITIONER

## 2017-07-05 PROCEDURE — 85025 COMPLETE CBC W/AUTO DIFF WBC: CPT | Performed by: INTERNAL MEDICINE

## 2017-07-05 PROCEDURE — 74011000250 HC RX REV CODE- 250: Performed by: INTERNAL MEDICINE

## 2017-07-05 PROCEDURE — 74011250636 HC RX REV CODE- 250/636: Performed by: NURSE PRACTITIONER

## 2017-07-05 PROCEDURE — 74011250636 HC RX REV CODE- 250/636: Performed by: INTERNAL MEDICINE

## 2017-07-05 PROCEDURE — 96417 CHEMO IV INFUS EACH ADDL SEQ: CPT

## 2017-07-05 RX ORDER — HEPARIN 100 UNIT/ML
600 SYRINGE INTRAVENOUS AS NEEDED
Status: ACTIVE | OUTPATIENT
Start: 2017-07-05 | End: 2017-07-05

## 2017-07-05 RX ORDER — HEPARIN 100 UNIT/ML
300-500 SYRINGE INTRAVENOUS AS NEEDED
Status: DISCONTINUED | OUTPATIENT
Start: 2017-07-05 | End: 2017-07-05

## 2017-07-05 RX ORDER — SODIUM CHLORIDE 0.9 % (FLUSH) 0.9 %
10 SYRINGE (ML) INJECTION AS NEEDED
Status: ACTIVE | OUTPATIENT
Start: 2017-07-05 | End: 2017-07-05

## 2017-07-05 RX ORDER — DIPHENHYDRAMINE HYDROCHLORIDE 50 MG/ML
50 INJECTION, SOLUTION INTRAMUSCULAR; INTRAVENOUS ONCE
Status: COMPLETED | OUTPATIENT
Start: 2017-07-05 | End: 2017-07-05

## 2017-07-05 RX ORDER — DEXAMETHASONE SODIUM PHOSPHATE 100 MG/10ML
10 INJECTION INTRAMUSCULAR; INTRAVENOUS ONCE
Status: COMPLETED | OUTPATIENT
Start: 2017-07-05 | End: 2017-07-05

## 2017-07-05 RX ORDER — FAMOTIDINE 10 MG/ML
20 INJECTION INTRAVENOUS ONCE
Status: COMPLETED | OUTPATIENT
Start: 2017-07-05 | End: 2017-07-05

## 2017-07-05 RX ORDER — ONDANSETRON 2 MG/ML
8 INJECTION INTRAMUSCULAR; INTRAVENOUS ONCE
Status: COMPLETED | OUTPATIENT
Start: 2017-07-05 | End: 2017-07-05

## 2017-07-05 RX ADMIN — ONDANSETRON 8 MG: 2 INJECTION INTRAMUSCULAR; INTRAVENOUS at 10:41

## 2017-07-05 RX ADMIN — Medication 10 ML: at 12:28

## 2017-07-05 RX ADMIN — DEXAMETHASONE SODIUM PHOSPHATE 10 MG: 10 INJECTION INTRAMUSCULAR; INTRAVENOUS at 10:42

## 2017-07-05 RX ADMIN — FAMOTIDINE 20 MG: 10 INJECTION, SOLUTION INTRAVENOUS at 10:38

## 2017-07-05 RX ADMIN — CARBOPLATIN 139 MG: 10 INJECTION, SOLUTION INTRAVENOUS at 12:00

## 2017-07-05 RX ADMIN — Medication 10 ML: at 10:30

## 2017-07-05 RX ADMIN — SODIUM CHLORIDE 500 ML: 900 INJECTION, SOLUTION INTRAVENOUS at 10:30

## 2017-07-05 RX ADMIN — DIPHENHYDRAMINE HYDROCHLORIDE 50 MG: 50 INJECTION, SOLUTION INTRAMUSCULAR; INTRAVENOUS at 10:49

## 2017-07-05 RX ADMIN — PACLITAXEL 95 MG: 6 INJECTION, SOLUTION, CONCENTRATE INTRAVENOUS at 10:57

## 2017-07-05 RX ADMIN — Medication 10 ML: at 12:29

## 2017-07-05 RX ADMIN — Medication 600 UNITS: at 12:29

## 2017-07-05 NOTE — PROGRESS NOTES
Pt arrived ambulatory to C with PICC dressing dry and intact and with good blood return in both lumens. NS infusing. Pre meds given IV as ordered. taxol infused over 1 hour. Carboplatin infused over 15 minutes. PICC flushed and packed with heparin. Pt aware of next appt on 7/12/17 at 1000. Pt discharged ambulatory.

## 2017-07-12 ENCOUNTER — PATIENT OUTREACH (OUTPATIENT)
Dept: CASE MANAGEMENT | Age: 77
End: 2017-07-12

## 2017-07-12 ENCOUNTER — HOSPITAL ENCOUNTER (OUTPATIENT)
Dept: LAB | Age: 77
Discharge: HOME | End: 2017-07-12
Payer: MEDICARE

## 2017-07-12 ENCOUNTER — HOSPITAL ENCOUNTER (OUTPATIENT)
Dept: INFUSION THERAPY | Age: 77
Discharge: HOME | End: 2017-07-12
Payer: MEDICARE

## 2017-07-12 VITALS — RESPIRATION RATE: 18 BRPM | SYSTOLIC BLOOD PRESSURE: 117 MMHG | HEART RATE: 75 BPM | DIASTOLIC BLOOD PRESSURE: 75 MMHG

## 2017-07-12 DIAGNOSIS — C34.10 MALIGNANT NEOPLASM OF UPPER LOBE OF LUNG, UNSPECIFIED LATERALITY (HCC): ICD-10-CM

## 2017-07-12 LAB
ALBUMIN SERPL BCP-MCNC: 3.5 G/DL (ref 3.2–4.6)
ALBUMIN/GLOB SERPL: 1 {RATIO} (ref 1.2–3.5)
ALP SERPL-CCNC: 86 U/L (ref 50–136)
ALT SERPL-CCNC: 36 U/L (ref 12–65)
ANION GAP BLD CALC-SCNC: 8 MMOL/L (ref 7–16)
AST SERPL W P-5'-P-CCNC: 18 U/L (ref 15–37)
BASOPHILS # BLD AUTO: 0 K/UL (ref 0–0.2)
BASOPHILS # BLD: 1 % (ref 0–2)
BILIRUB SERPL-MCNC: 0.8 MG/DL (ref 0.2–1.1)
BUN SERPL-MCNC: 19 MG/DL (ref 8–23)
CALCIUM SERPL-MCNC: 8.7 MG/DL (ref 8.3–10.4)
CHLORIDE SERPL-SCNC: 104 MMOL/L (ref 98–107)
CO2 SERPL-SCNC: 22 MMOL/L (ref 21–32)
CREAT SERPL-MCNC: 1.5 MG/DL (ref 0.8–1.5)
DIFFERENTIAL METHOD BLD: ABNORMAL
EOSINOPHIL # BLD: 0 K/UL (ref 0–0.8)
EOSINOPHIL NFR BLD: 0 % (ref 0.5–7.8)
ERYTHROCYTE [DISTWIDTH] IN BLOOD BY AUTOMATED COUNT: 12.9 % (ref 11.9–14.6)
GLOBULIN SER CALC-MCNC: 3.4 G/DL (ref 2.3–3.5)
GLUCOSE SERPL-MCNC: 159 MG/DL (ref 65–100)
HCT VFR BLD AUTO: 38.7 % (ref 41.1–50.3)
HGB BLD-MCNC: 13.5 G/DL (ref 13.6–17.2)
LYMPHOCYTES # BLD AUTO: 15 % (ref 13–44)
LYMPHOCYTES # BLD: 0.5 K/UL (ref 0.5–4.6)
MAGNESIUM SERPL-MCNC: 1.8 MG/DL (ref 1.8–2.4)
MCH RBC QN AUTO: 33.7 PG (ref 26.1–32.9)
MCHC RBC AUTO-ENTMCNC: 34.9 G/DL (ref 31.4–35)
MCV RBC AUTO: 96.5 FL (ref 79.6–97.8)
MONOCYTES # BLD: 0.2 K/UL (ref 0.1–1.3)
MONOCYTES NFR BLD AUTO: 6 % (ref 4–12)
NEUTS SEG # BLD: 2.6 K/UL (ref 1.7–8.2)
NEUTS SEG NFR BLD AUTO: 77 % (ref 43–78)
NRBC # BLD: 0 K/UL (ref 0–0.2)
PLATELET # BLD AUTO: 148 K/UL (ref 150–450)
PMV BLD AUTO: 8.9 FL (ref 10.8–14.1)
POTASSIUM SERPL-SCNC: 4.5 MMOL/L (ref 3.5–5.1)
PROT SERPL-MCNC: 6.9 G/DL (ref 6.3–8.2)
RBC # BLD AUTO: 4.01 M/UL (ref 4.23–5.67)
SODIUM SERPL-SCNC: 134 MMOL/L (ref 136–145)
WBC # BLD AUTO: 3.3 K/UL (ref 4.3–11.1)

## 2017-07-12 PROCEDURE — 96413 CHEMO IV INFUSION 1 HR: CPT

## 2017-07-12 PROCEDURE — 85025 COMPLETE CBC W/AUTO DIFF WBC: CPT | Performed by: INTERNAL MEDICINE

## 2017-07-12 PROCEDURE — 80053 COMPREHEN METABOLIC PANEL: CPT | Performed by: INTERNAL MEDICINE

## 2017-07-12 PROCEDURE — 83735 ASSAY OF MAGNESIUM: CPT | Performed by: INTERNAL MEDICINE

## 2017-07-12 PROCEDURE — 74011250636 HC RX REV CODE- 250/636: Performed by: INTERNAL MEDICINE

## 2017-07-12 PROCEDURE — 74011000250 HC RX REV CODE- 250: Performed by: INTERNAL MEDICINE

## 2017-07-12 PROCEDURE — 74011000258 HC RX REV CODE- 258: Performed by: INTERNAL MEDICINE

## 2017-07-12 PROCEDURE — 96375 TX/PRO/DX INJ NEW DRUG ADDON: CPT

## 2017-07-12 PROCEDURE — 96417 CHEMO IV INFUS EACH ADDL SEQ: CPT

## 2017-07-12 RX ORDER — HEPARIN 100 UNIT/ML
300-500 SYRINGE INTRAVENOUS AS NEEDED
Status: DISPENSED | OUTPATIENT
Start: 2017-07-12 | End: 2017-07-12

## 2017-07-12 RX ORDER — DEXAMETHASONE SODIUM PHOSPHATE 100 MG/10ML
10 INJECTION INTRAMUSCULAR; INTRAVENOUS ONCE
Status: COMPLETED | OUTPATIENT
Start: 2017-07-12 | End: 2017-07-12

## 2017-07-12 RX ORDER — ONDANSETRON 2 MG/ML
8 INJECTION INTRAMUSCULAR; INTRAVENOUS ONCE
Status: COMPLETED | OUTPATIENT
Start: 2017-07-12 | End: 2017-07-12

## 2017-07-12 RX ORDER — SODIUM CHLORIDE 0.9 % (FLUSH) 0.9 %
10 SYRINGE (ML) INJECTION AS NEEDED
Status: ACTIVE | OUTPATIENT
Start: 2017-07-12 | End: 2017-07-12

## 2017-07-12 RX ORDER — FAMOTIDINE 10 MG/ML
20 INJECTION INTRAVENOUS ONCE
Status: COMPLETED | OUTPATIENT
Start: 2017-07-12 | End: 2017-07-12

## 2017-07-12 RX ORDER — DIPHENHYDRAMINE HYDROCHLORIDE 50 MG/ML
50 INJECTION, SOLUTION INTRAMUSCULAR; INTRAVENOUS ONCE
Status: COMPLETED | OUTPATIENT
Start: 2017-07-12 | End: 2017-07-12

## 2017-07-12 RX ADMIN — Medication 10 ML: at 12:48

## 2017-07-12 RX ADMIN — DIPHENHYDRAMINE HYDROCHLORIDE 50 MG: 50 INJECTION, SOLUTION INTRAMUSCULAR; INTRAVENOUS at 10:41

## 2017-07-12 RX ADMIN — SODIUM CHLORIDE, PRESERVATIVE FREE 500 UNITS: 5 INJECTION INTRAVENOUS at 12:48

## 2017-07-12 RX ADMIN — SODIUM CHLORIDE 500 ML: 900 INJECTION, SOLUTION INTRAVENOUS at 10:21

## 2017-07-12 RX ADMIN — PACLITAXEL 95 MG: 6 INJECTION, SOLUTION, CONCENTRATE INTRAVENOUS at 11:10

## 2017-07-12 RX ADMIN — CARBOPLATIN 150 MG: 10 INJECTION, SOLUTION INTRAVENOUS at 12:07

## 2017-07-12 RX ADMIN — Medication 10 ML: at 10:21

## 2017-07-12 RX ADMIN — DEXAMETHASONE SODIUM PHOSPHATE 10 MG: 10 INJECTION INTRAMUSCULAR; INTRAVENOUS at 10:33

## 2017-07-12 RX ADMIN — ONDANSETRON 8 MG: 2 INJECTION INTRAMUSCULAR; INTRAVENOUS at 10:32

## 2017-07-12 RX ADMIN — FAMOTIDINE 20 MG: 10 INJECTION, SOLUTION INTRAVENOUS at 10:38

## 2017-07-12 NOTE — PROGRESS NOTES
[facility]. Patient tolerated well. Any issues or concerns during appointment: none. Patient aware of next infusion appointment on 07/19  at 0800 . Discharged ambulatory.

## 2017-07-12 NOTE — PROGRESS NOTES
Saw pt with Debby Mckeon NP for week 4 of concurrent chemo/rad with carbo/taxol. He continues to tolerate treatment well over all. Still has a rash on his arms but not bothersome. States he had a \"little blood\" on his tissue when he wiped his nose this morning. Plts are good so instructed to use saline spray to help keep it from getting dried out. Pt states he did not receive XRT last Friday due to the machine being down. Has not missed any other days though. Encouraged to call with concerns. Will continue to navigate.

## 2017-07-19 ENCOUNTER — PATIENT OUTREACH (OUTPATIENT)
Dept: CASE MANAGEMENT | Age: 77
End: 2017-07-19

## 2017-07-19 ENCOUNTER — HOSPITAL ENCOUNTER (OUTPATIENT)
Dept: LAB | Age: 77
Discharge: HOME | End: 2017-07-19
Payer: MEDICARE

## 2017-07-19 ENCOUNTER — HOSPITAL ENCOUNTER (OUTPATIENT)
Dept: INFUSION THERAPY | Age: 77
Discharge: HOME | End: 2017-07-19
Payer: MEDICARE

## 2017-07-19 DIAGNOSIS — C34.12 MALIGNANT NEOPLASM OF UPPER LOBE OF LEFT LUNG (HCC): ICD-10-CM

## 2017-07-19 DIAGNOSIS — C34.10 MALIGNANT NEOPLASM OF UPPER LOBE OF LUNG, UNSPECIFIED LATERALITY (HCC): ICD-10-CM

## 2017-07-19 LAB
ALBUMIN SERPL BCP-MCNC: 3.4 G/DL (ref 3.2–4.6)
ALBUMIN/GLOB SERPL: 1 {RATIO} (ref 1.2–3.5)
ALP SERPL-CCNC: 85 U/L (ref 50–136)
ALT SERPL-CCNC: 29 U/L (ref 12–65)
ANION GAP BLD CALC-SCNC: 7 MMOL/L (ref 7–16)
APPEARANCE UR: CLEAR
AST SERPL W P-5'-P-CCNC: 17 U/L (ref 15–37)
BASOPHILS # BLD AUTO: 0 K/UL (ref 0–0.2)
BASOPHILS # BLD: 1 % (ref 0–2)
BILIRUB SERPL-MCNC: 0.8 MG/DL (ref 0.2–1.1)
BILIRUB UR QL: NEGATIVE
BUN SERPL-MCNC: 25 MG/DL (ref 8–23)
CALCIUM SERPL-MCNC: 8.7 MG/DL (ref 8.3–10.4)
CHLORIDE SERPL-SCNC: 107 MMOL/L (ref 98–107)
CO2 SERPL-SCNC: 23 MMOL/L (ref 21–32)
COLOR UR: YELLOW
CREAT SERPL-MCNC: 2.04 MG/DL (ref 0.8–1.5)
DIFFERENTIAL METHOD BLD: ABNORMAL
EOSINOPHIL # BLD: 0 K/UL (ref 0–0.8)
EOSINOPHIL NFR BLD: 1 % (ref 0.5–7.8)
ERYTHROCYTE [DISTWIDTH] IN BLOOD BY AUTOMATED COUNT: 13.1 % (ref 11.9–14.6)
GLOBULIN SER CALC-MCNC: 3.5 G/DL (ref 2.3–3.5)
GLUCOSE SERPL-MCNC: 137 MG/DL (ref 65–100)
GLUCOSE UR STRIP.AUTO-MCNC: NEGATIVE MG/DL
HCT VFR BLD AUTO: 36.7 % (ref 41.1–50.3)
HGB BLD-MCNC: 12.9 G/DL (ref 13.6–17.2)
HGB UR QL STRIP: NEGATIVE
KETONES UR QL STRIP.AUTO: NEGATIVE MG/DL
LEUKOCYTE ESTERASE UR QL STRIP.AUTO: NEGATIVE
LYMPHOCYTES # BLD AUTO: 24 % (ref 13–44)
LYMPHOCYTES # BLD: 0.7 K/UL (ref 0.5–4.6)
MAGNESIUM SERPL-MCNC: 1.7 MG/DL (ref 1.8–2.4)
MCH RBC QN AUTO: 33.8 PG (ref 26.1–32.9)
MCHC RBC AUTO-ENTMCNC: 35.1 G/DL (ref 31.4–35)
MCV RBC AUTO: 96.1 FL (ref 79.6–97.8)
MONOCYTES # BLD: 0.3 K/UL (ref 0.1–1.3)
MONOCYTES NFR BLD AUTO: 9 % (ref 4–12)
NEUTS SEG # BLD: 1.9 K/UL (ref 1.7–8.2)
NEUTS SEG NFR BLD AUTO: 66 % (ref 43–78)
NITRITE UR QL STRIP.AUTO: NEGATIVE
NRBC # BLD: 0 K/UL (ref 0–0.2)
PH UR STRIP: 5.5 [PH] (ref 5–9)
PLATELET # BLD AUTO: 107 K/UL (ref 150–450)
PMV BLD AUTO: 9 FL (ref 10.8–14.1)
POTASSIUM SERPL-SCNC: 4.2 MMOL/L (ref 3.5–5.1)
PROT SERPL-MCNC: 6.9 G/DL (ref 6.3–8.2)
PROT UR STRIP-MCNC: NEGATIVE MG/DL
RBC # BLD AUTO: 3.82 M/UL (ref 4.23–5.67)
SODIUM SERPL-SCNC: 137 MMOL/L (ref 136–145)
SP GR UR REFRACTOMETRY: <=1.005 (ref 1–1.02)
UROBILINOGEN UR QL STRIP.AUTO: 0.2 EU/DL (ref 0.2–1)
WBC # BLD AUTO: 2.9 K/UL (ref 4.3–11.1)

## 2017-07-19 PROCEDURE — 81003 URINALYSIS AUTO W/O SCOPE: CPT | Performed by: INTERNAL MEDICINE

## 2017-07-19 PROCEDURE — 83735 ASSAY OF MAGNESIUM: CPT | Performed by: INTERNAL MEDICINE

## 2017-07-19 PROCEDURE — 96360 HYDRATION IV INFUSION INIT: CPT

## 2017-07-19 PROCEDURE — 87086 URINE CULTURE/COLONY COUNT: CPT | Performed by: INTERNAL MEDICINE

## 2017-07-19 PROCEDURE — 80053 COMPREHEN METABOLIC PANEL: CPT | Performed by: INTERNAL MEDICINE

## 2017-07-19 PROCEDURE — 74011250636 HC RX REV CODE- 250/636: Performed by: INTERNAL MEDICINE

## 2017-07-19 PROCEDURE — 85025 COMPLETE CBC W/AUTO DIFF WBC: CPT | Performed by: INTERNAL MEDICINE

## 2017-07-19 RX ORDER — HEPARIN 100 UNIT/ML
600 SYRINGE INTRAVENOUS AS NEEDED
Status: DISCONTINUED | OUTPATIENT
Start: 2017-07-19 | End: 2017-07-22 | Stop reason: HOSPADM

## 2017-07-19 RX ORDER — SODIUM CHLORIDE 0.9 % (FLUSH) 0.9 %
10-40 SYRINGE (ML) INJECTION AS NEEDED
Status: DISCONTINUED | OUTPATIENT
Start: 2017-07-19 | End: 2017-07-22 | Stop reason: HOSPADM

## 2017-07-19 RX ORDER — SODIUM CHLORIDE 9 MG/ML
1000 INJECTION, SOLUTION INTRAVENOUS ONCE
Status: COMPLETED | OUTPATIENT
Start: 2017-07-19 | End: 2017-07-19

## 2017-07-19 RX ADMIN — SODIUM CHLORIDE, PRESERVATIVE FREE 600 UNITS: 5 INJECTION INTRAVENOUS at 11:15

## 2017-07-19 RX ADMIN — Medication 20 ML: at 11:15

## 2017-07-19 RX ADMIN — SODIUM CHLORIDE 1000 ML: 900 INJECTION, SOLUTION INTRAVENOUS at 10:00

## 2017-07-19 NOTE — PROGRESS NOTES
7/19/17 saw pt today with Dr. Little Franks for pre chemo cycle 5 carbo/taxol. He is tolerating chemo well. Radiation at [facility], 19 completed. Chemo will be held today due to elevated kidney function. Fluids for 3 days. Follow up in 1 week. Encouraged to call with any concerns. Navigation will continue to follow.

## 2017-07-19 NOTE — PROGRESS NOTES
Arrived to the [facility]. Assessment completed. Patient was seen in [address] today, before coming to infusion. Labs reviewed. Chemotherapy was cancelled for today, by [facility] while he was there. Patient tolerated IV fluids well today. There was a urine collected today for U/A and culture, as ordered. Sent an e-mail to ACACIA Sanchez, navigator, to inform her to look at the U/A results when they returned. Any issues or concerns during appointment: none. Patient aware of next infusion appointment on 7/20/17 (date) at 0800 (time) with IV infusion center. Discharged ambulatory, per self. Patient instructed to call Dr. Silvester Romberg' office immediately for any problems or concerns. He verbalizes understanding.

## 2017-07-20 ENCOUNTER — HOSPITAL ENCOUNTER (OUTPATIENT)
Dept: INFUSION THERAPY | Age: 77
Discharge: HOME | End: 2017-07-20
Payer: MEDICARE

## 2017-07-20 VITALS
HEART RATE: 86 BPM | DIASTOLIC BLOOD PRESSURE: 60 MMHG | SYSTOLIC BLOOD PRESSURE: 111 MMHG | OXYGEN SATURATION: 97 % | RESPIRATION RATE: 18 BRPM | TEMPERATURE: 97.5 F

## 2017-07-20 DIAGNOSIS — C34.10 MALIGNANT NEOPLASM OF UPPER LOBE OF LUNG, UNSPECIFIED LATERALITY (HCC): ICD-10-CM

## 2017-07-20 PROCEDURE — 96360 HYDRATION IV INFUSION INIT: CPT

## 2017-07-20 PROCEDURE — 74011250636 HC RX REV CODE- 250/636: Performed by: INTERNAL MEDICINE

## 2017-07-20 RX ORDER — HEPARIN 100 UNIT/ML
500 SYRINGE INTRAVENOUS AS NEEDED
Status: DISPENSED | OUTPATIENT
Start: 2017-07-20 | End: 2017-07-20

## 2017-07-20 RX ORDER — SODIUM CHLORIDE 9 MG/ML
1000 INJECTION, SOLUTION INTRAVENOUS ONCE
Status: COMPLETED | OUTPATIENT
Start: 2017-07-20 | End: 2017-07-20

## 2017-07-20 RX ORDER — SODIUM CHLORIDE 0.9 % (FLUSH) 0.9 %
10-40 SYRINGE (ML) INJECTION AS NEEDED
Status: DISCONTINUED | OUTPATIENT
Start: 2017-07-20 | End: 2017-07-23 | Stop reason: HOSPADM

## 2017-07-20 RX ADMIN — SODIUM CHLORIDE 1000 ML: 900 INJECTION, SOLUTION INTRAVENOUS at 08:02

## 2017-07-20 RX ADMIN — SODIUM CHLORIDE, PRESERVATIVE FREE 500 UNITS: 5 INJECTION INTRAVENOUS at 09:09

## 2017-07-20 RX ADMIN — Medication 10 ML: at 09:08

## 2017-07-20 RX ADMIN — Medication 10 ML: at 08:02

## 2017-07-21 ENCOUNTER — HOSPITAL ENCOUNTER (OUTPATIENT)
Dept: INFUSION THERAPY | Age: 77
Discharge: HOME | End: 2017-07-21
Payer: MEDICARE

## 2017-07-21 VITALS
HEART RATE: 97 BPM | BODY MASS INDEX: 25.77 KG/M2 | RESPIRATION RATE: 18 BRPM | SYSTOLIC BLOOD PRESSURE: 113 MMHG | TEMPERATURE: 98.2 F | WEIGHT: 190 LBS | OXYGEN SATURATION: 94 % | DIASTOLIC BLOOD PRESSURE: 58 MMHG

## 2017-07-21 DIAGNOSIS — C34.10 MALIGNANT NEOPLASM OF UPPER LOBE OF LUNG, UNSPECIFIED LATERALITY (HCC): ICD-10-CM

## 2017-07-21 LAB
BACTERIA SPEC CULT: NORMAL
SERVICE CMNT-IMP: NORMAL

## 2017-07-21 PROCEDURE — 96360 HYDRATION IV INFUSION INIT: CPT

## 2017-07-21 PROCEDURE — 74011250636 HC RX REV CODE- 250/636: Performed by: INTERNAL MEDICINE

## 2017-07-21 RX ORDER — HEPARIN 100 UNIT/ML
600 SYRINGE INTRAVENOUS AS NEEDED
Status: DISPENSED | OUTPATIENT
Start: 2017-07-21 | End: 2017-07-22

## 2017-07-21 RX ORDER — SODIUM CHLORIDE 9 MG/ML
1000 INJECTION, SOLUTION INTRAVENOUS ONCE
Status: COMPLETED | OUTPATIENT
Start: 2017-07-21 | End: 2017-07-21

## 2017-07-21 RX ADMIN — SODIUM CHLORIDE, PRESERVATIVE FREE 600 UNITS: 5 INJECTION INTRAVENOUS at 17:47

## 2017-07-21 RX ADMIN — SODIUM CHLORIDE 1000 ML: 900 INJECTION, SOLUTION INTRAVENOUS at 16:45

## 2017-07-21 NOTE — PROGRESS NOTES
Arrived to the [facility] ambulatory. hydration completed. Patient tolerated well. Any issues or concerns during appointment: no.  Patient aware of next infusion appointment on  7/26 at 0915. Discharged to home ambulatory.

## 2017-07-26 ENCOUNTER — PATIENT OUTREACH (OUTPATIENT)
Dept: CASE MANAGEMENT | Age: 77
End: 2017-07-26

## 2017-07-26 ENCOUNTER — HOSPITAL ENCOUNTER (OUTPATIENT)
Dept: LAB | Age: 77
Discharge: HOME | End: 2017-07-26
Payer: MEDICARE

## 2017-07-26 ENCOUNTER — HOSPITAL ENCOUNTER (OUTPATIENT)
Dept: INFUSION THERAPY | Age: 77
Discharge: HOME | End: 2017-07-26
Payer: MEDICARE

## 2017-07-26 DIAGNOSIS — C34.12 MALIGNANT NEOPLASM OF UPPER LOBE OF LEFT LUNG (HCC): ICD-10-CM

## 2017-07-26 DIAGNOSIS — C34.10 MALIGNANT NEOPLASM OF UPPER LOBE OF LUNG, UNSPECIFIED LATERALITY (HCC): ICD-10-CM

## 2017-07-26 LAB
ALBUMIN SERPL BCP-MCNC: 3.3 G/DL (ref 3.2–4.6)
ALBUMIN/GLOB SERPL: 1 {RATIO}
ALP SERPL-CCNC: 87 U/L (ref 50–136)
ALT SERPL-CCNC: 28 U/L (ref 12–65)
ANION GAP BLD CALC-SCNC: 11 MMOL/L
AST SERPL W P-5'-P-CCNC: 14 U/L (ref 15–37)
BASOPHILS # BLD AUTO: 0 K/UL (ref 0–0.2)
BASOPHILS # BLD: 1 % (ref 0–2)
BILIRUB SERPL-MCNC: 0.7 MG/DL (ref 0.2–1.1)
BUN SERPL-MCNC: 10 MG/DL (ref 8–23)
CALCIUM SERPL-MCNC: 8.8 MG/DL (ref 8.3–10.4)
CHLORIDE SERPL-SCNC: 106 MMOL/L (ref 98–107)
CO2 SERPL-SCNC: 23 MMOL/L (ref 21–32)
CREAT SERPL-MCNC: 1.5 MG/DL (ref 0.8–1.5)
DIFFERENTIAL METHOD BLD: ABNORMAL
EOSINOPHIL # BLD: 0 K/UL (ref 0–0.8)
EOSINOPHIL NFR BLD: 0 % (ref 0.5–7.8)
ERYTHROCYTE [DISTWIDTH] IN BLOOD BY AUTOMATED COUNT: 14.3 % (ref 11.9–14.6)
GLOBULIN SER CALC-MCNC: 3.2 G/DL
GLUCOSE SERPL-MCNC: 189 MG/DL (ref 65–100)
HCT VFR BLD AUTO: 36.3 % (ref 41.1–50.3)
HGB BLD-MCNC: 12.5 G/DL (ref 13.6–17.2)
LYMPHOCYTES # BLD AUTO: 21 % (ref 13–44)
LYMPHOCYTES # BLD: 0.6 K/UL (ref 0.5–4.6)
MAGNESIUM SERPL-MCNC: 1.8 MG/DL (ref 1.8–2.4)
MCH RBC QN AUTO: 34.3 PG (ref 26.1–32.9)
MCHC RBC AUTO-ENTMCNC: 34.4 G/DL (ref 31.4–35)
MCV RBC AUTO: 99.7 FL (ref 79.6–97.8)
MONOCYTES # BLD: 0.3 K/UL (ref 0.1–1.3)
MONOCYTES NFR BLD AUTO: 11 % (ref 4–12)
NEUTS SEG # BLD: 1.9 K/UL (ref 1.7–8.2)
NEUTS SEG NFR BLD AUTO: 67 % (ref 43–78)
NRBC # BLD: 0 K/UL (ref 0–0.2)
PLATELET # BLD AUTO: 108 K/UL (ref 150–450)
PMV BLD AUTO: 8.9 FL (ref 10.8–14.1)
POTASSIUM SERPL-SCNC: 4.1 MMOL/L (ref 3.5–5.1)
PROT SERPL-MCNC: 6.5 G/DL (ref 6.3–8.2)
RBC # BLD AUTO: 3.64 M/UL (ref 4.23–5.67)
SODIUM SERPL-SCNC: 140 MMOL/L (ref 136–145)
WBC # BLD AUTO: 2.8 K/UL (ref 4.3–11.1)

## 2017-07-26 PROCEDURE — 83735 ASSAY OF MAGNESIUM: CPT | Performed by: INTERNAL MEDICINE

## 2017-07-26 PROCEDURE — 74011250636 HC RX REV CODE- 250/636: Performed by: INTERNAL MEDICINE

## 2017-07-26 PROCEDURE — 74011000258 HC RX REV CODE- 258: Performed by: INTERNAL MEDICINE

## 2017-07-26 PROCEDURE — 96375 TX/PRO/DX INJ NEW DRUG ADDON: CPT

## 2017-07-26 PROCEDURE — 80053 COMPREHEN METABOLIC PANEL: CPT | Performed by: INTERNAL MEDICINE

## 2017-07-26 PROCEDURE — 85025 COMPLETE CBC W/AUTO DIFF WBC: CPT | Performed by: INTERNAL MEDICINE

## 2017-07-26 PROCEDURE — 96413 CHEMO IV INFUSION 1 HR: CPT

## 2017-07-26 PROCEDURE — 74011000250 HC RX REV CODE- 250: Performed by: INTERNAL MEDICINE

## 2017-07-26 PROCEDURE — 96361 HYDRATE IV INFUSION ADD-ON: CPT

## 2017-07-26 PROCEDURE — 96411 CHEMO IV PUSH ADDL DRUG: CPT

## 2017-07-26 RX ORDER — FAMOTIDINE 10 MG/ML
20 INJECTION INTRAVENOUS ONCE
Status: DISCONTINUED | OUTPATIENT
Start: 2017-07-26 | End: 2017-07-26 | Stop reason: SDUPTHER

## 2017-07-26 RX ORDER — HEPARIN 100 UNIT/ML
300-500 SYRINGE INTRAVENOUS AS NEEDED
Status: DISPENSED | OUTPATIENT
Start: 2017-07-26 | End: 2017-07-26

## 2017-07-26 RX ORDER — DEXAMETHASONE SODIUM PHOSPHATE 100 MG/10ML
10 INJECTION INTRAMUSCULAR; INTRAVENOUS ONCE
Status: COMPLETED | OUTPATIENT
Start: 2017-07-26 | End: 2017-07-26

## 2017-07-26 RX ORDER — ONDANSETRON 2 MG/ML
8 INJECTION INTRAMUSCULAR; INTRAVENOUS ONCE
Status: COMPLETED | OUTPATIENT
Start: 2017-07-26 | End: 2017-07-26

## 2017-07-26 RX ORDER — SODIUM CHLORIDE 0.9 % (FLUSH) 0.9 %
10 SYRINGE (ML) INJECTION AS NEEDED
Status: ACTIVE | OUTPATIENT
Start: 2017-07-26 | End: 2017-07-26

## 2017-07-26 RX ORDER — DIPHENHYDRAMINE HYDROCHLORIDE 50 MG/ML
50 INJECTION, SOLUTION INTRAMUSCULAR; INTRAVENOUS ONCE
Status: COMPLETED | OUTPATIENT
Start: 2017-07-26 | End: 2017-07-26

## 2017-07-26 RX ADMIN — DEXAMETHASONE SODIUM PHOSPHATE 10 MG: 10 INJECTION INTRAMUSCULAR; INTRAVENOUS at 10:26

## 2017-07-26 RX ADMIN — PACLITAXEL 95 MG: 6 INJECTION, SOLUTION, CONCENTRATE INTRAVENOUS at 11:24

## 2017-07-26 RX ADMIN — Medication 10 ML: at 10:21

## 2017-07-26 RX ADMIN — CARBOPLATIN 149.1 MG: 10 INJECTION, SOLUTION INTRAVENOUS at 12:27

## 2017-07-26 RX ADMIN — DIPHENHYDRAMINE HYDROCHLORIDE 50 MG: 50 INJECTION, SOLUTION INTRAMUSCULAR; INTRAVENOUS at 10:29

## 2017-07-26 RX ADMIN — ONDANSETRON 8 MG: 2 INJECTION INTRAMUSCULAR; INTRAVENOUS at 10:25

## 2017-07-26 RX ADMIN — Medication 10 ML: at 10:20

## 2017-07-26 RX ADMIN — FAMOTIDINE 20 MG: 10 INJECTION, SOLUTION INTRAVENOUS at 10:33

## 2017-07-26 RX ADMIN — SODIUM CHLORIDE 1000 ML: 900 INJECTION, SOLUTION INTRAVENOUS at 10:36

## 2017-07-26 RX ADMIN — Medication 20 ML: at 13:18

## 2017-07-26 RX ADMIN — SODIUM CHLORIDE, PRESERVATIVE FREE 600 UNITS: 5 INJECTION INTRAVENOUS at 13:18

## 2017-07-26 NOTE — PROGRESS NOTES
Arrived to the [facility]. IVF, Taxol, and Carboplatin completed. Patient tolerated well. Any issues or concerns during appointment: None. Patient aware of next infusion appointment on 7/29/17 at 1300. Discharged ambulatory from Infusion. Patient to go to radiation now.

## 2017-07-26 NOTE — PROGRESS NOTES
Saw pt with Dr. Eula Buckley for pre chemo visit. Pt's labs have improved since last week and now able to proceed with treatment. He is scheduled to complete radiation the end of next week and will receive last weekly chemo next Wednesday. Dr. Eula Buckley would like pt to be scheduled for 1L IVF this Friday and next Friday to hopefully prevent elevated creat and liver enzymes again. He will have a PET in mid September and f/u with Dr. Eula Buckley after. Depending on scan results, Dr. Eula Buckley may recommend 2 consolidation doses of carbo/taxol. Pt aware and in agreement with plan. Encouraged to call with questions or concerns. Will continue to navigate.

## 2017-07-29 ENCOUNTER — HOSPITAL ENCOUNTER (OUTPATIENT)
Dept: INFUSION THERAPY | Age: 77
Discharge: HOME | End: 2017-07-29
Payer: MEDICARE

## 2017-07-29 VITALS
WEIGHT: 193.4 LBS | OXYGEN SATURATION: 93 % | TEMPERATURE: 98 F | HEART RATE: 98 BPM | SYSTOLIC BLOOD PRESSURE: 99 MMHG | DIASTOLIC BLOOD PRESSURE: 54 MMHG | RESPIRATION RATE: 18 BRPM | BODY MASS INDEX: 26.23 KG/M2

## 2017-07-29 DIAGNOSIS — C34.12 MALIGNANT NEOPLASM OF UPPER LOBE OF LEFT LUNG (HCC): ICD-10-CM

## 2017-07-29 DIAGNOSIS — C34.10 MALIGNANT NEOPLASM OF UPPER LOBE OF LUNG, UNSPECIFIED LATERALITY (HCC): ICD-10-CM

## 2017-07-29 PROCEDURE — 74011250636 HC RX REV CODE- 250/636: Performed by: INTERNAL MEDICINE

## 2017-07-29 PROCEDURE — 96360 HYDRATION IV INFUSION INIT: CPT

## 2017-07-29 RX ORDER — HEPARIN 100 UNIT/ML
300-600 SYRINGE INTRAVENOUS AS NEEDED
Status: DISCONTINUED | OUTPATIENT
Start: 2017-07-29 | End: 2017-08-02 | Stop reason: HOSPADM

## 2017-07-29 RX ORDER — SODIUM CHLORIDE 9 MG/ML
1000 INJECTION, SOLUTION INTRAVENOUS ONCE
Status: COMPLETED | OUTPATIENT
Start: 2017-07-29 | End: 2017-07-29

## 2017-07-29 RX ORDER — SODIUM CHLORIDE 0.9 % (FLUSH) 0.9 %
10-40 SYRINGE (ML) INJECTION AS NEEDED
Status: DISCONTINUED | OUTPATIENT
Start: 2017-07-29 | End: 2017-08-02 | Stop reason: HOSPADM

## 2017-07-29 RX ADMIN — SODIUM CHLORIDE 1000 ML: 900 INJECTION, SOLUTION INTRAVENOUS at 12:43

## 2017-07-29 RX ADMIN — Medication 10 ML: at 12:40

## 2017-07-29 RX ADMIN — SODIUM CHLORIDE, PRESERVATIVE FREE 600 UNITS: 5 INJECTION INTRAVENOUS at 13:44

## 2017-07-29 RX ADMIN — Medication 20 ML: at 13:44

## 2017-07-29 NOTE — PROGRESS NOTES
Arrived to the [facility]. 1 liter NS completed. Patient tolerated well. Any issues or concerns during appointment: none. Patient aware of next infusion appointment on 8/2/17 at 9:15am.  Discharged ambulatory.

## 2017-08-02 ENCOUNTER — HOSPITAL ENCOUNTER (OUTPATIENT)
Dept: INFUSION THERAPY | Age: 77
Discharge: HOME | End: 2017-08-02
Payer: MEDICARE

## 2017-08-02 ENCOUNTER — PATIENT OUTREACH (OUTPATIENT)
Dept: CASE MANAGEMENT | Age: 77
End: 2017-08-02

## 2017-08-02 ENCOUNTER — HOSPITAL ENCOUNTER (OUTPATIENT)
Dept: LAB | Age: 77
Discharge: HOME | End: 2017-08-02
Payer: MEDICARE

## 2017-08-02 VITALS — OXYGEN SATURATION: 96 %

## 2017-08-02 DIAGNOSIS — C34.10 MALIGNANT NEOPLASM OF UPPER LOBE OF LUNG, UNSPECIFIED LATERALITY (HCC): ICD-10-CM

## 2017-08-02 LAB
ALBUMIN SERPL BCP-MCNC: 3.3 G/DL (ref 3.2–4.6)
ALBUMIN/GLOB SERPL: 0.9 {RATIO} (ref 1.2–3.5)
ALP SERPL-CCNC: 87 U/L (ref 50–136)
ALT SERPL-CCNC: 31 U/L (ref 12–65)
ANION GAP BLD CALC-SCNC: 6 MMOL/L (ref 7–16)
AST SERPL W P-5'-P-CCNC: 19 U/L (ref 15–37)
BASOPHILS # BLD AUTO: 0 K/UL (ref 0–0.2)
BASOPHILS # BLD: 1 % (ref 0–2)
BILIRUB SERPL-MCNC: 0.7 MG/DL (ref 0.2–1.1)
BUN SERPL-MCNC: 19 MG/DL (ref 8–23)
CALCIUM SERPL-MCNC: 8.7 MG/DL (ref 8.3–10.4)
CHLORIDE SERPL-SCNC: 105 MMOL/L (ref 98–107)
CO2 SERPL-SCNC: 25 MMOL/L (ref 21–32)
CREAT SERPL-MCNC: 1.35 MG/DL (ref 0.8–1.5)
DIFFERENTIAL METHOD BLD: ABNORMAL
EOSINOPHIL # BLD: 0 K/UL (ref 0–0.8)
EOSINOPHIL NFR BLD: 0 % (ref 0.5–7.8)
ERYTHROCYTE [DISTWIDTH] IN BLOOD BY AUTOMATED COUNT: 14.1 % (ref 11.9–14.6)
GLOBULIN SER CALC-MCNC: 3.5 G/DL (ref 2.3–3.5)
GLUCOSE SERPL-MCNC: 136 MG/DL (ref 65–100)
HCT VFR BLD AUTO: 35 % (ref 41.1–50.3)
HGB BLD-MCNC: 12.1 G/DL (ref 13.6–17.2)
LYMPHOCYTES # BLD AUTO: 21 % (ref 13–44)
LYMPHOCYTES # BLD: 0.6 K/UL (ref 0.5–4.6)
MAGNESIUM SERPL-MCNC: 1.7 MG/DL (ref 1.8–2.4)
MCH RBC QN AUTO: 33.9 PG (ref 26.1–32.9)
MCHC RBC AUTO-ENTMCNC: 34.6 G/DL (ref 31.4–35)
MCV RBC AUTO: 98 FL (ref 79.6–97.8)
MONOCYTES # BLD: 0.2 K/UL (ref 0.1–1.3)
MONOCYTES NFR BLD AUTO: 7 % (ref 4–12)
NEUTS SEG # BLD: 1.9 K/UL (ref 1.7–8.2)
NEUTS SEG NFR BLD AUTO: 71 % (ref 43–78)
NRBC # BLD: 0 K/UL (ref 0–0.2)
PLATELET # BLD AUTO: 166 K/UL (ref 150–450)
PMV BLD AUTO: 8.8 FL (ref 10.8–14.1)
POTASSIUM SERPL-SCNC: 4.4 MMOL/L (ref 3.5–5.1)
PROT SERPL-MCNC: 6.8 G/DL (ref 6.3–8.2)
RBC # BLD AUTO: 3.57 M/UL (ref 4.23–5.67)
SODIUM SERPL-SCNC: 136 MMOL/L (ref 136–145)
WBC # BLD AUTO: 2.7 K/UL (ref 4.3–11.1)

## 2017-08-02 PROCEDURE — 74011000258 HC RX REV CODE- 258: Performed by: INTERNAL MEDICINE

## 2017-08-02 PROCEDURE — 85025 COMPLETE CBC W/AUTO DIFF WBC: CPT | Performed by: NURSE PRACTITIONER

## 2017-08-02 PROCEDURE — 96413 CHEMO IV INFUSION 1 HR: CPT

## 2017-08-02 PROCEDURE — 74011000250 HC RX REV CODE- 250: Performed by: INTERNAL MEDICINE

## 2017-08-02 PROCEDURE — 83735 ASSAY OF MAGNESIUM: CPT | Performed by: NURSE PRACTITIONER

## 2017-08-02 PROCEDURE — 80053 COMPREHEN METABOLIC PANEL: CPT | Performed by: NURSE PRACTITIONER

## 2017-08-02 PROCEDURE — 74011250636 HC RX REV CODE- 250/636: Performed by: INTERNAL MEDICINE

## 2017-08-02 PROCEDURE — 96417 CHEMO IV INFUS EACH ADDL SEQ: CPT

## 2017-08-02 PROCEDURE — 96375 TX/PRO/DX INJ NEW DRUG ADDON: CPT

## 2017-08-02 RX ORDER — LORAZEPAM 2 MG/ML
0.5 INJECTION INTRAMUSCULAR
Status: ACTIVE | OUTPATIENT
Start: 2017-08-02 | End: 2017-08-02

## 2017-08-02 RX ORDER — ONDANSETRON 2 MG/ML
8 INJECTION INTRAMUSCULAR; INTRAVENOUS ONCE
Status: COMPLETED | OUTPATIENT
Start: 2017-08-02 | End: 2017-08-02

## 2017-08-02 RX ORDER — FAMOTIDINE 10 MG/ML
20 INJECTION INTRAVENOUS ONCE
Status: COMPLETED | OUTPATIENT
Start: 2017-08-02 | End: 2017-08-02

## 2017-08-02 RX ORDER — HEPARIN 100 UNIT/ML
600 SYRINGE INTRAVENOUS AS NEEDED
Status: DISPENSED | OUTPATIENT
Start: 2017-08-02 | End: 2017-08-02

## 2017-08-02 RX ORDER — SODIUM CHLORIDE 0.9 % (FLUSH) 0.9 %
10 SYRINGE (ML) INJECTION AS NEEDED
Status: ACTIVE | OUTPATIENT
Start: 2017-08-02 | End: 2017-08-02

## 2017-08-02 RX ORDER — DEXAMETHASONE SODIUM PHOSPHATE 100 MG/10ML
10 INJECTION INTRAMUSCULAR; INTRAVENOUS ONCE
Status: COMPLETED | OUTPATIENT
Start: 2017-08-02 | End: 2017-08-02

## 2017-08-02 RX ORDER — DIPHENHYDRAMINE HYDROCHLORIDE 50 MG/ML
50 INJECTION, SOLUTION INTRAMUSCULAR; INTRAVENOUS ONCE
Status: COMPLETED | OUTPATIENT
Start: 2017-08-02 | End: 2017-08-02

## 2017-08-02 RX ORDER — HYDROCORTISONE SODIUM SUCCINATE 100 MG/2ML
100 INJECTION, POWDER, FOR SOLUTION INTRAMUSCULAR; INTRAVENOUS AS NEEDED
Status: DISPENSED | OUTPATIENT
Start: 2017-08-02 | End: 2017-08-02

## 2017-08-02 RX ADMIN — CARBOPLATIN 159 MG: 10 INJECTION, SOLUTION INTRAVENOUS at 11:43

## 2017-08-02 RX ADMIN — Medication 10 ML: at 12:23

## 2017-08-02 RX ADMIN — DIPHENHYDRAMINE HYDROCHLORIDE 50 MG: 50 INJECTION, SOLUTION INTRAMUSCULAR; INTRAVENOUS at 09:50

## 2017-08-02 RX ADMIN — SODIUM CHLORIDE 1000 ML: 900 INJECTION, SOLUTION INTRAVENOUS at 09:15

## 2017-08-02 RX ADMIN — PACLITAXEL 95 MG: 6 INJECTION, SOLUTION, CONCENTRATE INTRAVENOUS at 10:30

## 2017-08-02 RX ADMIN — Medication 10 ML: at 12:22

## 2017-08-02 RX ADMIN — SODIUM CHLORIDE, PRESERVATIVE FREE 600 UNITS: 5 INJECTION INTRAVENOUS at 12:23

## 2017-08-02 RX ADMIN — DEXAMETHASONE SODIUM PHOSPHATE 10 MG: 10 INJECTION INTRAMUSCULAR; INTRAVENOUS at 09:35

## 2017-08-02 RX ADMIN — ONDANSETRON 8 MG: 2 INJECTION INTRAMUSCULAR; INTRAVENOUS at 09:33

## 2017-08-02 RX ADMIN — FAMOTIDINE 20 MG: 10 INJECTION, SOLUTION INTRAVENOUS at 09:45

## 2017-08-02 NOTE — PROGRESS NOTES
Problem: Chemotherapy Treatment  Goal: *Chemotherapy regimen followed  Outcome: Progressing Towards Goal  Reviewed chemotherapy POC with patient.

## 2017-08-02 NOTE — PROGRESS NOTES
Met with pt prior to last cycle of treatment today. Pt scheduled for IVF's this Friday. Encouraged to increase fluid intake and if unable to do so to call so he can be scheduled for IVF's. Pt verbalized understanding. Scheduled for PET scan and to f/u with Dr. Edilma Anthony in mid September. At that time will discuss whether to proceed with 2 consolidation doses or not. Pt aware to call for any concerns prior. Will continue to navigate.

## 2017-08-02 NOTE — PROGRESS NOTES
Tolerated chemotherapy without difficulty. Directed to push oral hydration. Directed if unable to drink adequately to notify MD.  Patient discharged via ambulation accompanied by self. Instructed to notify physician of any problems, questions or concerns. Allowed opportunity for patient/family to ask questions. Verbalized understanding. Next appointment is 08/04/17 at 1700 with Yaa for IVF.

## 2017-08-04 ENCOUNTER — HOSPITAL ENCOUNTER (OUTPATIENT)
Dept: INFUSION THERAPY | Age: 77
Discharge: HOME | End: 2017-08-04
Payer: MEDICARE

## 2017-08-04 VITALS
SYSTOLIC BLOOD PRESSURE: 90 MMHG | WEIGHT: 188.6 LBS | DIASTOLIC BLOOD PRESSURE: 56 MMHG | OXYGEN SATURATION: 92 % | HEART RATE: 101 BPM | TEMPERATURE: 97.6 F | BODY MASS INDEX: 25.58 KG/M2 | RESPIRATION RATE: 18 BRPM

## 2017-08-04 DIAGNOSIS — C34.10 MALIGNANT NEOPLASM OF UPPER LOBE OF LUNG, UNSPECIFIED LATERALITY (HCC): ICD-10-CM

## 2017-08-04 PROCEDURE — 96360 HYDRATION IV INFUSION INIT: CPT

## 2017-08-04 PROCEDURE — 74011250636 HC RX REV CODE- 250/636: Performed by: INTERNAL MEDICINE

## 2017-08-04 RX ORDER — SODIUM CHLORIDE 0.9 % (FLUSH) 0.9 %
10 SYRINGE (ML) INJECTION EVERY 8 HOURS
Status: DISCONTINUED | OUTPATIENT
Start: 2017-08-04 | End: 2017-08-08 | Stop reason: HOSPADM

## 2017-08-04 RX ORDER — HEPARIN 100 UNIT/ML
300 SYRINGE INTRAVENOUS AS NEEDED
Status: DISPENSED | OUTPATIENT
Start: 2017-08-04 | End: 2017-08-05

## 2017-08-04 RX ORDER — SODIUM CHLORIDE 9 MG/ML
1000 INJECTION, SOLUTION INTRAVENOUS ONCE
Status: COMPLETED | OUTPATIENT
Start: 2017-08-04 | End: 2017-08-04

## 2017-08-04 RX ADMIN — SODIUM CHLORIDE 1000 ML: 900 INJECTION, SOLUTION INTRAVENOUS at 17:06

## 2017-08-04 RX ADMIN — Medication 300 UNITS: at 18:09

## 2017-08-04 RX ADMIN — Medication 300 UNITS: at 18:10

## 2017-08-04 RX ADMIN — Medication 10 ML: at 18:10

## 2017-08-04 NOTE — PROGRESS NOTES
Arrived to the [facility]. 1 liter NS completed. Patient tolerated well. Any issues or concerns during appointment: none. Patient aware of next infusion appointment on 8-9-17 (date) at 5 (time). Discharged via ambulatory.

## 2017-08-09 ENCOUNTER — APPOINTMENT (OUTPATIENT)
Dept: INFUSION THERAPY | Age: 77
End: 2017-08-09
Payer: MEDICARE

## 2017-08-09 ENCOUNTER — HOSPITAL ENCOUNTER (OUTPATIENT)
Dept: INFUSION THERAPY | Age: 77
Discharge: HOME | End: 2017-08-09
Payer: MEDICARE

## 2017-08-09 VITALS
OXYGEN SATURATION: 96 % | DIASTOLIC BLOOD PRESSURE: 56 MMHG | RESPIRATION RATE: 18 BRPM | SYSTOLIC BLOOD PRESSURE: 97 MMHG | WEIGHT: 186.2 LBS | BODY MASS INDEX: 25.25 KG/M2 | TEMPERATURE: 97.8 F | HEART RATE: 97 BPM

## 2017-08-09 PROCEDURE — 74011250636 HC RX REV CODE- 250/636: Performed by: INTERNAL MEDICINE

## 2017-08-09 PROCEDURE — 96360 HYDRATION IV INFUSION INIT: CPT

## 2017-08-09 RX ORDER — SODIUM CHLORIDE 0.9 % (FLUSH) 0.9 %
10-20 SYRINGE (ML) INJECTION AS NEEDED
Status: ACTIVE | OUTPATIENT
Start: 2017-08-09 | End: 2017-08-09

## 2017-08-09 RX ORDER — HEPARIN 100 UNIT/ML
300-600 SYRINGE INTRAVENOUS ONCE
Status: COMPLETED | OUTPATIENT
Start: 2017-08-09 | End: 2017-08-09

## 2017-08-09 RX ADMIN — Medication 10 ML: at 09:15

## 2017-08-09 RX ADMIN — SODIUM CHLORIDE, PRESERVATIVE FREE 600 UNITS: 5 INJECTION INTRAVENOUS at 09:50

## 2017-08-09 RX ADMIN — Medication 10 ML: at 09:50

## 2017-08-09 RX ADMIN — SODIUM CHLORIDE 500 ML: 900 INJECTION, SOLUTION INTRAVENOUS at 09:15

## 2017-08-09 NOTE — PROGRESS NOTES
Arrived to the [facility]. PICC drsg changed. Patient tolerated well. Any issues or concerns during appointment: Pt with c/o dizziness/lightheadedness when standing. Administered NS bolus per protocol. Pt tolerated well. Patient aware of next infusion appointment on August 16th at 0800. Discharged ambulatory.

## 2017-08-16 NOTE — PROGRESS NOTES
A dressing change was performed on [facility] for his PICC (mediport/PICC line). Site location: right arm  Site description: CDI    Supplies and equipment were organized at the bedside to decrease the amount of time that the site was open. The present dressing was removed carefully to minimize trauma and to prevent accidental dislodgement of the catheter. The skin and catheter site were inspected for signs of infection, leakage, or other mechanical problems. None noted    The dressing was labelled with time, date of dressing change and insertion, and initials. All documentation for the dressing change and condition of the insertion site was completed on the Doc Flowsheet in [facility].     Problems Encountered:  none    Indra Nunes RN

## 2017-08-23 NOTE — PROGRESS NOTES
Arrived to the [facility] for PICC drsg change. Pt orthostatic with c/o increased SOB. CBC and CMP drawn per orders and administered NS bolus. Patient tolerated well. Any issues or concerns during appointment: Pt to see pulmonary doctor on the 31st.  Instructed patient to call or go to ER if SOB worsens. Patient aware of next infusion appointment on August 30th at 0800. Discharged ambulatory.

## 2017-08-30 NOTE — PROGRESS NOTES
Arrived to the [facility]. PICC line dressing change completed. Patient tolerated well. Any issues or concerns during appointment: none. Patient aware of next infusion appointment on 09/06  at 0800 . Discharged ambulatory.

## 2017-09-06 NOTE — PROGRESS NOTES
Tolerated PICC dressing change via sterile technique without difficulty. Site clear. Patient discharged via ambulation accompanied by self. Instructed to notify physician of any problems, questions or concerns. Allowed opportunity for patient/family to ask questions. Verbalized understanding. Next appointment is 09/13/17 at 0800 with Y[facility].

## 2017-09-06 NOTE — PROGRESS NOTES
Problem: Patient Education: Go to Education Activity  Goal: Patient/Family Education  Outcome: Progressing Towards Goal  Reviewed PICC dressing change. Verbalizes understanding.

## 2017-09-13 NOTE — PROGRESS NOTES
Arrived to the [facility]. Assessment completed. Patient tolerated PICC line dressing change well. Any issues or concerns during appointment: none. Patient aware of next infusion appointment on 9/21/17 (date) at 1600 (time) with IV infusion center. Discharged ambulatory, per self. Patient instructed to call Dr. Ritu Pacheco' office immediately for any problems or concerns. He verbalizes understanding.

## 2017-09-21 NOTE — PROGRESS NOTES
Arrived to the [facility]. PICC dressing change completed. Patient tolerated well. Any issues or concerns during appointment: no.  Patient aware of next infusion appointment on 9/27 (date) at 56 (time). Discharged ambulatory.

## 2017-09-27 NOTE — PROGRESS NOTES
Arrived to the [facility]. Carbo/taxol completed. Patient tolerated well.    Any issues or concerns during appointment: no.  Discharged ambulatory

## 2017-10-04 NOTE — PROGRESS NOTES
Saw pt with NP for post Carbo/Taxol toxicity check for lung cancer. Numbers are recovering. Pt to get IV fluids today and PICC dressing change. Scheduled treatment for October 18th with follow up with Dr Lorelei Serna. Navigation will continue to follow. Encourage pt to call Navigator with questions.   Myron Schwartz RN

## 2017-10-04 NOTE — PROGRESS NOTES
Arrived to the [facility]. Hydration completed and PICC line dressing changed. Patient tolerated well. Any issues or concerns during appointment: none. Patient aware of next infusion appointment on 10/6/17 at 1500. Discharged home ambulatory.       Justo Luna RN

## 2017-10-13 NOTE — PROGRESS NOTES
Arrived to the [facility]. 1 liter NS completed. Patient tolerated well. Any issues or concerns during appointment: none. Patient aware of next infusion appointment on 10-18-17 (date) at 5 (time). Discharged via ambulatory.

## 2017-10-18 NOTE — PROGRESS NOTES
Saw patient after visit with Dr Amina Young for lung cancer. Proceed with your last consolidation chemo today! PET scan before next visit in two months. PICC to be pulled after last scheduled IVF's. Encouraged to call with questions. Navigation will continue to follow.

## 2017-10-19 NOTE — PROGRESS NOTES
Arrived to the [facility]. Taxol and Carboplatin chemo completed. Patient tolerated well. Any issues or concerns during appointment: NO.  Patient aware of next infusion appointment on 10/20/17 (date) at 1500 (time). Discharged ambulatory.

## 2017-10-25 NOTE — PROGRESS NOTES
[facility] completed. Patient tolerated well. Any issues or concerns during appointment: no.  Patient aware of next infusion appointment on 11/1 (date) at 12 (time). Discharged home.

## 2017-11-01 NOTE — PROGRESS NOTES
Arrived to the [facility]. Assessment completed. Patient tolerated IV fluids well today. He drank a container of Apple Juice and Boost while here today. Any issues or concerns during appointment: noted orthostatic hypotension when patient arrived today. This was resolved at discharge. (see doc. Flowsheet). Patient aware of next infusion appointment on 11/6/17 (date) at 5 (time) with IV infusion center. Discharged ambulatory, per self. Patient instructed to call Dr. Mary Ellen Garrido' office immediately for any problems or concerns. He verbalizes understanding.

## 2017-11-06 NOTE — PROGRESS NOTES
Arrived to infusion  Only concern today is lack of appetite,reviewed snack options to increase intake  Hypotensive,denies dizziness or falls  1L NS infused tolerated well  BP stable post infusion  Next appt 11/8

## 2017-11-08 NOTE — PROGRESS NOTES
Arrived to the [address] at the [facility]. IVF and PICC line dressing change completed. Patient tolerated well. Any issues or concerns during appointment: none. Patient aware of next infusion appointment on 11/15  at 0800 . Discharged ambulatory.

## 2017-11-15 NOTE — PROGRESS NOTES
Arrived to the [facility]. Dressing change completed. Patient tolerated well. Any issues or concerns during appointment: no, pt VSS, states he is eating and drinking adequately, did not want fluids today. Patient aware of next infusion appointment on 11/22/17 (date) at 5 (time). Discharged ambulatory.

## 2017-11-22 NOTE — PROGRESS NOTES
Arrived to the [facility]. PICC line dressing chaged. Patient tolerated well. Any issues or concerns during appointment: none. Patient aware of next infusion appointment on 11/29/17 at 0800. Discharged ambulatory.     Farzana Hanson RN

## 2017-12-06 NOTE — PROGRESS NOTES
Pt arrived ambulatory to [facility]. PICC dressing changed sterile technique. Pt aware of next appt on 12/27/17 at 1500. Pt discharged ambulatory.

## 2017-12-12 NOTE — PROGRESS NOTES
Pt arrived ambulatory to [facility]. PICC dressing changed sterile technique. Pt aware of next appt on 12/20/17 at 1500. Pt discharged ambulatory.

## 2018-01-01 ENCOUNTER — HOSPITAL ENCOUNTER (OUTPATIENT)
Dept: LAB | Age: 78
Discharge: HOME | End: 2018-03-21
Payer: MEDICARE

## 2018-01-01 ENCOUNTER — HOSPITAL ENCOUNTER (OUTPATIENT)
Dept: CT IMAGING | Age: 78
Discharge: HOME | End: 2018-03-15
Attending: INTERNAL MEDICINE
Payer: MEDICARE

## 2018-01-01 ENCOUNTER — HOSPITAL ENCOUNTER (OUTPATIENT)
Dept: CT IMAGING | Age: 78
Discharge: HOME | End: 2018-07-06
Attending: INTERNAL MEDICINE
Payer: MEDICARE

## 2018-01-01 ENCOUNTER — HOSPITAL ENCOUNTER (OUTPATIENT)
Dept: CT IMAGING | Age: 78
Discharge: HOME | End: 2018-03-14
Attending: INTERNAL MEDICINE
Payer: MEDICARE

## 2018-01-01 ENCOUNTER — HOSPITAL ENCOUNTER (OUTPATIENT)
Dept: LAB | Age: 78
Discharge: HOME | End: 2018-07-26
Payer: MEDICARE

## 2018-01-01 ENCOUNTER — HOSPITAL ENCOUNTER (EMERGENCY)
Age: 78
End: 2018-08-12
Attending: EMERGENCY MEDICINE
Payer: MEDICARE

## 2018-01-01 VITALS — HEIGHT: 72 IN | WEIGHT: 185 LBS | BODY MASS INDEX: 25.06 KG/M2

## 2018-01-01 VITALS — WEIGHT: 190 LBS | HEIGHT: 72 IN | BODY MASS INDEX: 25.73 KG/M2

## 2018-01-01 DIAGNOSIS — R09.2 RESPIRATORY ARREST (HCC): Primary | ICD-10-CM

## 2018-01-01 DIAGNOSIS — C34.12 MALIGNANT NEOPLASM OF UPPER LOBE OF LEFT LUNG (HCC): ICD-10-CM

## 2018-01-01 DIAGNOSIS — C34.10 MALIGNANT NEOPLASM OF UPPER LOBE OF LUNG, UNSPECIFIED LATERALITY (HCC): ICD-10-CM

## 2018-01-01 LAB
ALBUMIN SERPL-MCNC: 3.8 G/DL (ref 3.2–4.6)
ALBUMIN SERPL-MCNC: 4.1 G/DL (ref 3.2–4.6)
ALBUMIN/GLOB SERPL: 1 {RATIO} (ref 1.2–3.5)
ALBUMIN/GLOB SERPL: 1.1 {RATIO} (ref 1.2–3.5)
ALP SERPL-CCNC: 81 U/L (ref 50–136)
ALP SERPL-CCNC: 87 U/L (ref 50–136)
ALT SERPL-CCNC: 26 U/L (ref 12–65)
ALT SERPL-CCNC: 32 U/L (ref 12–65)
ANION GAP SERPL CALC-SCNC: 10 MMOL/L (ref 7–16)
ANION GAP SERPL CALC-SCNC: 7 MMOL/L (ref 7–16)
AST SERPL-CCNC: 13 U/L (ref 15–37)
AST SERPL-CCNC: 15 U/L (ref 15–37)
BASOPHILS # BLD: 0 K/UL (ref 0–0.2)
BASOPHILS # BLD: 0 K/UL (ref 0–0.2)
BASOPHILS NFR BLD: 1 % (ref 0–2)
BASOPHILS NFR BLD: 1 % (ref 0–2)
BILIRUB SERPL-MCNC: 0.7 MG/DL (ref 0.2–1.1)
BILIRUB SERPL-MCNC: 0.7 MG/DL (ref 0.2–1.1)
BUN SERPL-MCNC: 16 MG/DL (ref 8–23)
BUN SERPL-MCNC: 27 MG/DL (ref 8–23)
CALCIUM SERPL-MCNC: 9 MG/DL (ref 8.3–10.4)
CALCIUM SERPL-MCNC: 9.8 MG/DL (ref 8.3–10.4)
CHLORIDE SERPL-SCNC: 102 MMOL/L (ref 98–107)
CHLORIDE SERPL-SCNC: 105 MMOL/L (ref 98–107)
CO2 SERPL-SCNC: 26 MMOL/L (ref 21–32)
CO2 SERPL-SCNC: 26 MMOL/L (ref 21–32)
CREAT BLD-MCNC: 1.3 MG/DL (ref 0.8–1.5)
CREAT BLD-MCNC: 1.4 MG/DL (ref 0.8–1.5)
CREAT BLD-MCNC: 1.6 MG/DL (ref 0.8–1.5)
CREAT BLD-MCNC: 1.6 MG/DL (ref 0.8–1.5)
CREAT SERPL-MCNC: 1.54 MG/DL (ref 0.8–1.5)
CREAT SERPL-MCNC: 1.62 MG/DL (ref 0.8–1.5)
DIFFERENTIAL METHOD BLD: ABNORMAL
DIFFERENTIAL METHOD BLD: ABNORMAL
EOSINOPHIL # BLD: 0 K/UL (ref 0–0.8)
EOSINOPHIL # BLD: 0.1 K/UL (ref 0–0.8)
EOSINOPHIL NFR BLD: 0 % (ref 0.5–7.8)
EOSINOPHIL NFR BLD: 1 % (ref 0.5–7.8)
ERYTHROCYTE [DISTWIDTH] IN BLOOD BY AUTOMATED COUNT: 13.1 % (ref 11.9–14.6)
ERYTHROCYTE [DISTWIDTH] IN BLOOD BY AUTOMATED COUNT: 13.4 % (ref 11.9–14.6)
GLOBULIN SER CALC-MCNC: 3.5 G/DL (ref 2.3–3.5)
GLOBULIN SER CALC-MCNC: 4.2 G/DL (ref 2.3–3.5)
GLUCOSE SERPL-MCNC: 105 MG/DL (ref 65–100)
GLUCOSE SERPL-MCNC: 144 MG/DL (ref 65–100)
HCT VFR BLD AUTO: 45.4 % (ref 41.1–50.3)
HCT VFR BLD AUTO: 46 % (ref 41.1–50.3)
HGB BLD-MCNC: 15.1 G/DL (ref 13.6–17.2)
HGB BLD-MCNC: 15.7 G/DL (ref 13.6–17.2)
LYMPHOCYTES # BLD: 0.9 K/UL (ref 0.5–4.6)
LYMPHOCYTES # BLD: 1.1 K/UL (ref 0.5–4.6)
LYMPHOCYTES NFR BLD: 12 % (ref 13–44)
LYMPHOCYTES NFR BLD: 17 % (ref 13–44)
MCH RBC QN AUTO: 33.8 PG (ref 26.1–32.9)
MCH RBC QN AUTO: 33.9 PG (ref 26.1–32.9)
MCHC RBC AUTO-ENTMCNC: 33.3 G/DL (ref 31.4–35)
MCHC RBC AUTO-ENTMCNC: 34.1 G/DL (ref 31.4–35)
MCV RBC AUTO: 102 FL (ref 79.6–97.8)
MCV RBC AUTO: 98.9 FL (ref 79.6–97.8)
MONOCYTES # BLD: 0.5 K/UL (ref 0.1–1.3)
MONOCYTES # BLD: 0.6 K/UL (ref 0.1–1.3)
MONOCYTES NFR BLD: 8 % (ref 4–12)
MONOCYTES NFR BLD: 9 % (ref 4–12)
NEUTS SEG # BLD: 4.6 K/UL (ref 1.7–8.2)
NEUTS SEG # BLD: 5.8 K/UL (ref 1.7–8.2)
NEUTS SEG NFR BLD: 73 % (ref 43–78)
NEUTS SEG NFR BLD: 80 % (ref 43–78)
NRBC # BLD: 0 K/UL (ref 0–0.2)
NRBC # BLD: 0.01 K/UL (ref 0–0.2)
PLATELET # BLD AUTO: 171 K/UL (ref 150–450)
PLATELET # BLD AUTO: 200 K/UL (ref 150–450)
PMV BLD AUTO: 8.5 FL (ref 10.8–14.1)
PMV BLD AUTO: 8.5 FL (ref 10.8–14.1)
POTASSIUM SERPL-SCNC: 3.9 MMOL/L (ref 3.5–5.1)
POTASSIUM SERPL-SCNC: 4.9 MMOL/L (ref 3.5–5.1)
PROT SERPL-MCNC: 7.3 G/DL (ref 6.3–8.2)
PROT SERPL-MCNC: 8.3 G/DL (ref 6.3–8.2)
RBC # BLD AUTO: 4.45 M/UL (ref 4.23–5.67)
RBC # BLD AUTO: 4.65 M/UL (ref 4.23–5.67)
SODIUM SERPL-SCNC: 135 MMOL/L (ref 136–145)
SODIUM SERPL-SCNC: 141 MMOL/L (ref 136–145)
WBC # BLD AUTO: 6.3 K/UL (ref 4.3–11.1)
WBC # BLD AUTO: 7.2 K/UL (ref 4.3–11.1)

## 2018-01-01 PROCEDURE — 74011636320 HC RX REV CODE- 636/320: Performed by: INTERNAL MEDICINE

## 2018-01-01 PROCEDURE — 85025 COMPLETE CBC W/AUTO DIFF WBC: CPT | Performed by: INTERNAL MEDICINE

## 2018-01-01 PROCEDURE — 80053 COMPREHEN METABOLIC PANEL: CPT | Performed by: INTERNAL MEDICINE

## 2018-01-01 PROCEDURE — 82565 ASSAY OF CREATININE: CPT

## 2018-01-01 PROCEDURE — 74011000258 HC RX REV CODE- 258: Performed by: INTERNAL MEDICINE

## 2018-01-01 PROCEDURE — 92950 HEART/LUNG RESUSCITATION CPR: CPT | Performed by: EMERGENCY MEDICINE

## 2018-01-01 PROCEDURE — 31500 INSERT EMERGENCY AIRWAY: CPT | Performed by: EMERGENCY MEDICINE

## 2018-01-01 PROCEDURE — 36415 COLL VENOUS BLD VENIPUNCTURE: CPT | Performed by: INTERNAL MEDICINE

## 2018-01-01 PROCEDURE — 74177 CT ABD & PELVIS W/CONTRAST: CPT

## 2018-01-01 PROCEDURE — 99285 EMERGENCY DEPT VISIT HI MDM: CPT | Performed by: EMERGENCY MEDICINE

## 2018-01-01 RX ORDER — SODIUM CHLORIDE 0.9 % (FLUSH) 0.9 %
10 SYRINGE (ML) INJECTION
Status: COMPLETED | OUTPATIENT
Start: 2018-01-01 | End: 2018-01-01

## 2018-01-01 RX ORDER — SODIUM CHLORIDE 0.9 % (FLUSH) 0.9 %
10 SYRINGE (ML) INJECTION
Status: ACTIVE | OUTPATIENT
Start: 2018-01-01 | End: 2018-01-01

## 2018-01-01 RX ORDER — EPINEPHRINE 0.1 MG/ML
INJECTION INTRACARDIAC; INTRAVENOUS
Status: DISCONTINUED
Start: 2018-01-01 | End: 2018-08-12 | Stop reason: HOSPADM

## 2018-01-01 RX ADMIN — Medication 10 ML: at 10:03

## 2018-01-01 RX ADMIN — DIATRIZOATE MEGLUMINE AND DIATRIZOATE SODIUM 15 ML: 660; 100 LIQUID ORAL; RECTAL at 10:47

## 2018-01-01 RX ADMIN — Medication 10 ML: at 10:47

## 2018-01-01 RX ADMIN — SODIUM CHLORIDE 100 ML: 900 INJECTION, SOLUTION INTRAVENOUS at 10:47

## 2018-01-01 RX ADMIN — SODIUM CHLORIDE 100 ML: 900 INJECTION, SOLUTION INTRAVENOUS at 10:03

## 2018-01-01 RX ADMIN — IOPAMIDOL 100 ML: 755 INJECTION, SOLUTION INTRAVENOUS at 10:03

## 2018-01-01 RX ADMIN — IOPAMIDOL 100 ML: 755 INJECTION, SOLUTION INTRAVENOUS at 10:47

## 2018-01-01 RX ADMIN — DIATRIZOATE MEGLUMINE AND DIATRIZOATE SODIUM 15 ML: 660; 100 LIQUID ORAL; RECTAL at 10:03

## 2018-01-10 PROBLEM — R91.1 NODULE OF RIGHT LUNG: Status: ACTIVE | Noted: 2018-01-01

## 2018-06-28 PROBLEM — E11.21 TYPE 2 DIABETES WITH NEPHROPATHY (HCC): Status: ACTIVE | Noted: 2018-01-01

## 2018-08-11 NOTE — ED TRIAGE NOTES
Asystole on the monitor. No pulse. cpr started. Pt off vent and bagged by resp. 5th epi given.  1liter of ns pressured bagged in per md.

## 2018-08-11 NOTE — ED TRIAGE NOTES
Pulse check. No pulse. Asystole on the monitor. 2nd epi given. 18g rac placed. Labs drawn. Pt intubated with ed md and resp at bedside. 7.5 et tube. 23at the lip. Positive breath sounds. Positive color change. Continued to bag.

## 2018-08-11 NOTE — ED TRIAGE NOTES
cpr in process upon arrival. Ems bagging pt. Placed pt on pads, continued cpr. Confirmed no pulse. Asystole on the monitor. First epi given at 1922. Continued cpr.

## 2018-08-11 NOTE — ED TRIAGE NOTES
Pulse check. No pulse. cpr continued. Per dr. Frankie Olivia, no epi at this time. Will recheck in 3 minutes.

## 2018-08-12 NOTE — PROGRESS NOTES
Patient removed from ventilator to start CPR again following asystole and loss of pulse/HR. Began bagging again.

## 2018-08-12 NOTE — ED NOTES
Pause CPR and bagging, dr yeager ultrasound at bedside, do not continue CPR. No orders.  Aspirations and pulses absent

## 2018-08-12 NOTE — ED NOTES
Personal belongings located. Phone, insurance card, and photo ID. Placed in biohazard bag at bedside.

## 2018-08-12 NOTE — ED PROVIDER NOTES
HPI Comments: Patient presented with EMS in asystole with bag-valve-mask ventilation in process. EMS reports they got called by the son for difficulty breathing and on arrival they found a patient who was severely wheezing and having respiratory distress. They gave a breathing treatment and placed the patient on CPAP and started transport. During transport they did stop to try to  Intubate the patient but they were unable to do so. They then continue to provide bag valve mask ventilations because the patient was not breathing well and on arrival to our ER he became pulseless. EMS was doing CPR as they were bringing him into the ER. Please see the code summary for specific details but the patient received 6 rounds of epi and 2 rounds of bicarbonate. After the fourth epi and second bicarbonate did have a few minutes of spontaneous return of his pulse and a blood pressure. However within a couple of minutes he became pulseless again CPR was restarted. After 2 additional epi and several more minutes of CPR he remained in asystole and he had no cardiac activity on ultrasound so he was pronounced at 2001. Patient is a 77 y.o. male presenting with cardiac arrest. The history is provided by the EMS personnel.    Cardiac arrest           Past Medical History:   Diagnosis Date    Actinic keratosis     Benign neoplasm of skin, site unspecified     Calculus of kidney     Cancer (HCC)     lung-radiation    Chronic kidney disease     elevated creatinine 1.36    Chronic obstructive pulmonary disease (HCC)     \" lungs work at approx 30%, controlled by inhalers\"    Enlargement of lymph nodes     Hypertrophy of prostate without urinary obstruction and other lower urinary tract symptoms (LUTS)     Impaired fasting glucose     Lung mass     left lung mass suspicious of lung ca    Malignant neoplasm of upper lobe of lung ([address] 5/17/2017    Other and unspecified hyperlipidemia     Tobacco use disorder     Type II or unspecified type diabetes mellitus without mention of complication, not stated as uncontrolled     \"borderline\", does not chk at home, not recommended, today's blood glucose was 106 after eating    Unspecified essential hypertension     controlled by losartan and amlodipine       Past Surgical History:   Procedure Laterality Date    CHEST SURGERY PROCEDURE UNLISTED      Lung Biopsy x2    HX CAROTID ENDARTERECTOMY Left 2015    HX CATARACT REMOVAL Left     HX HERNIA REPAIR  2010    multiple, right ,left nad in the middle    HX OTHER SURGICAL      HX UROLOGICAL      kidney stone ext    HX UROLOGICAL      TURP Dr. Nani Ayala         Family History:   Problem Relation Age of Onset    Stroke Mother     Heart Disease Father 66      of MI    Diabetes Brother        Social History     Social History    Marital status:      Spouse name: N/A    Number of children: 3    Years of education: N/A     Occupational History    Retired      Social History Main Topics    Smoking status: Former Smoker     Packs/day: 1.50     Years: 43.00     Types: Cigarettes     Quit date: 2015    Smokeless tobacco: Former User    Alcohol use 4.2 oz/week     7 Standard drinks or equivalent per week      Comment: 7 drinks per week    Drug use: No    Sexual activity: Not on file     Other Topics Concern    Not on file     Social History Narrative    He used to work as a manager, no known history of exposure to asbestos or tuberculosis of the best of his knowledge. He has no pets and no history of exposure to birds. ALLERGIES: Review of patient's allergies indicates no known allergies. Review of Systems   Unable to perform ROS: Patient unresponsive       There were no vitals filed for this visit. Physical Exam   Constitutional:   Patient was mildly cyanotic on arrival   HENT:   Head: Normocephalic and atraumatic.    Eyes:   Pupils were nonreactive on arrival   Neck: No tracheal deviation present. Skin:   No wounds seen   Nursing note and vitals reviewed. MDM  Number of Diagnoses or Management Options  Diagnosis management comments: ===================================================================  This patient is critically ill and there is a high probability of of imminent or life threatening deterioration in the patient's condition without immediate management. The nature of the patient's clinical problem is: respiratory arrest    I have spent 40 minutes in direct patient care, documentation, review of labs/xrays/old records, discussion with. The time involved in the performance of separately reportable procedures was not counted toward critical care time. Joanna Mccartney MD; 8/11/2018 @8:29 PM  ===================================================================            ED Course       Intubation  Date/Time: 8/11/2018 8:30 PM  Performed by: Dianne Andrea by: January Flower     Consent:     Consent obtained:  Emergent situation  Pre-procedure details:     Patient status:  Unresponsive    Pretreatment medications:  None    Paralytics:  None  Procedure details:     Preoxygenation:  Bag valve mask    CPR in progress: yes      Intubation method:  Oral    Oral intubation technique:  Video-assisted    Laryngoscope blade:   Mac 3    Tube type:  Cuffed    Number of attempts:  1    Ventilation between attempts: no      Cricoid pressure: no      Tube visualized through cords: yes    Placement assessment:     Tube secured with:  ETT jarrell    Breath sounds:  Equal and absent over the epigastrium    Placement verification: chest rise

## 2018-08-12 NOTE — PROGRESS NOTES
Pt placed on vent after positive pulse and BP reading following CPR; stopped bagging and placed on ventilator

## 2018-08-12 NOTE — PROGRESS NOTES
Comforted family with presence and prayer    Altagracia Ivey, staff [address], [address] [facility]  /   [email or web address]

## 2018-08-12 NOTE — ED NOTES
Patient cleaned, presentable with clean linens, electrodes and pads removed at this time, ET tube and bilateral AC IV in place. Respiratory therapist and RN at bedside at this time.

## (undated) DEVICE — KENDALL RADIOLUCENT FOAM MONITORING ELECTRODE RECTANGULAR SHAPE: Brand: KENDALL

## (undated) DEVICE — (D)SYR 10ML 1/5ML GRAD NSAF -- PKGING CHANGE USE ITEM 338027

## (undated) DEVICE — NDL BIOP VIZISHOT ASPR 40MM --

## (undated) DEVICE — SOL INJ SOD CL0.9% 10ML PREFIL --

## (undated) DEVICE — BRONCHOSCOPY PACK: Brand: MEDLINE INDUSTRIES, INC.

## (undated) DEVICE — SET EXTN L6IN W/ S STL CLMP

## (undated) DEVICE — SINGLE USE BIOPSY VALVE MAJ-210: Brand: SINGLE USE BIOPSY VALVE (STERILE)

## (undated) DEVICE — SINGLE USE ASPIRATION NEEDLE: Brand: SINGLE USE ASPIRATION NEEDLE

## (undated) DEVICE — SINGLE USE SUCTION VALVE MAJ-209: Brand: SINGLE USE SUCTION VALVE (STERILE)

## (undated) DEVICE — Device: Brand: BALLOON